# Patient Record
Sex: FEMALE | Race: BLACK OR AFRICAN AMERICAN | NOT HISPANIC OR LATINO | Employment: FULL TIME | URBAN - METROPOLITAN AREA
[De-identification: names, ages, dates, MRNs, and addresses within clinical notes are randomized per-mention and may not be internally consistent; named-entity substitution may affect disease eponyms.]

---

## 2017-03-27 ENCOUNTER — TELEPHONE (OUTPATIENT)
Dept: OBSTETRICS AND GYNECOLOGY | Age: 37
End: 2017-03-27

## 2017-05-12 ENCOUNTER — PROCEDURE VISIT (OUTPATIENT)
Dept: OBSTETRICS AND GYNECOLOGY | Age: 37
End: 2017-05-12

## 2017-05-12 ENCOUNTER — OFFICE VISIT (OUTPATIENT)
Dept: OBSTETRICS AND GYNECOLOGY | Age: 37
End: 2017-05-12

## 2017-05-12 VITALS
BODY MASS INDEX: 40.5 KG/M2 | HEIGHT: 66 IN | DIASTOLIC BLOOD PRESSURE: 74 MMHG | SYSTOLIC BLOOD PRESSURE: 130 MMHG | WEIGHT: 252 LBS

## 2017-05-12 DIAGNOSIS — D21.9 LEIOMYOMA: Primary | ICD-10-CM

## 2017-05-12 DIAGNOSIS — N92.0 MENORRHAGIA WITH REGULAR CYCLE: Primary | ICD-10-CM

## 2017-05-12 DIAGNOSIS — D21.9 LEIOMYOMA: ICD-10-CM

## 2017-05-12 PROCEDURE — 99212 OFFICE O/P EST SF 10 MIN: CPT | Performed by: OBSTETRICS & GYNECOLOGY

## 2017-05-12 PROCEDURE — 76830 TRANSVAGINAL US NON-OB: CPT | Performed by: OBSTETRICS & GYNECOLOGY

## 2017-09-15 ENCOUNTER — OFFICE VISIT (OUTPATIENT)
Dept: OBSTETRICS AND GYNECOLOGY | Age: 37
End: 2017-09-15

## 2017-09-15 VITALS
DIASTOLIC BLOOD PRESSURE: 68 MMHG | HEIGHT: 66 IN | WEIGHT: 257 LBS | SYSTOLIC BLOOD PRESSURE: 130 MMHG | BODY MASS INDEX: 41.3 KG/M2

## 2017-09-15 DIAGNOSIS — Z11.51 SCREENING FOR HPV (HUMAN PAPILLOMAVIRUS): ICD-10-CM

## 2017-09-15 DIAGNOSIS — D21.9 LEIOMYOMA: ICD-10-CM

## 2017-09-15 DIAGNOSIS — Z01.419 VISIT FOR GYNECOLOGIC EXAMINATION: Primary | ICD-10-CM

## 2017-09-15 PROCEDURE — 99395 PREV VISIT EST AGE 18-39: CPT | Performed by: OBSTETRICS & GYNECOLOGY

## 2017-09-15 NOTE — PROGRESS NOTES
"Subjective     Chief Complaint   Patient presents with   • Annual Exam     no problems       History of Present Illness    Hector Gilbert is a 37 y.o.  who presents for annual exam.  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         Current contraception: abstinence  History of abnormal Pap smear: no  Received Gardasil immunization: no  Perform regular self breast exam: yes - monthly  Family history of uterine or ovarian cancer: no  Family History of colon cancer: yes - cousin  Family history of breast cancer: yes - pat aunts-believes dx in 50's    Mammogram: not indicated.  Colonoscopy: not indicated.  DEXA: not indicated.    Exercise: moderately active  Calcium/Vitamin D: adequate intake    The following portions of the patient's history were reviewed and updated as appropriate: allergies, current medications, past family history, past medical history, past social history, past surgical history and problem list.    Review of Systems    Review of Systems   Constitutional: Negative for fatigue.   Respiratory: Negative for shortness of breath.    Gastrointestinal: Negative for abdominal pain.negative for change in bowel habits or rectal bleeding   Genitourinary: Negative for dysuria. negative for abnormal bleeding  Neurological: Negative for headaches.   Psychiatric/Behavioral: Negative for dysphoric mood.         Objective   Physical Exam    /68  Ht 66\" (167.6 cm)  Wt 257 lb (117 kg)  LMP 2017  BMI 41.48 kg/m2    General:   alert, appears stated age, cooperative and no distress   Neck: no asymmetry, masses, or scars and thyroid normal to palpation "   Heart: regular rate and rhythm, S1, S2 normal, no murmur, click, rub or gallop   Lungs: clear to auscultation bilaterally   Abdomen: soft, non-tender, without masses or organomegaly   Breast: inspection negative, no nipple discharge or bleeding, no masses or nodularity palpable and no axillary adenopathy   Vulva: normal, Bartholin's, Urethra, [place]'s normal   Vagina: normal mucosa, normal discharge   Cervix: no lesions   Uterus: normal size, mobile, non-tender, small fibroid on anterior surface stable   Adnexa: normal adnexa and no mass, fullness, tenderness   Rectal: not indicated     Assessment/Plan   Hector was seen today for annual exam.    Diagnoses and all orders for this visit:    Visit for gynecologic examination  -     IGP, Apt HPV,rfx 16 / 18,45    Screening for HPV (human papillomavirus)  -     IGP, Apt HPV,rfx 16 / 18,45      All questions answered.  Breast self exam technique reviewed and patient encouraged to perform self-exam monthly.  Discussed healthy lifestyle modifications.  Recommended 30 minutes of aerobic exercise five times per week.    Pap done per pt request    Small stable uterine fibroid palpated, imaged twice last year

## 2017-09-20 LAB
CYTOLOGIST CVX/VAG CYTO: NORMAL
CYTOLOGY CVX/VAG DOC THIN PREP: NORMAL
DX ICD CODE: NORMAL
HIV 1 & 2 AB SER-IMP: NORMAL
HPV I/H RISK 4 DNA CVX QL PROBE+SIG AMP: NEGATIVE
Lab: NORMAL
OTHER STN SPEC: NORMAL
PATH REPORT.FINAL DX SPEC: NORMAL
STAT OF ADQ CVX/VAG CYTO-IMP: NORMAL

## 2017-09-21 ENCOUNTER — TELEPHONE (OUTPATIENT)
Dept: OBSTETRICS AND GYNECOLOGY | Age: 37
End: 2017-09-21

## 2017-09-21 NOTE — TELEPHONE ENCOUNTER
----- Message from Candice Walsh MD sent at 9/20/2017  2:13 PM EDT -----  Negative pap and hpv, please call pt with results

## 2018-11-06 ENCOUNTER — OFFICE VISIT (OUTPATIENT)
Dept: OBSTETRICS AND GYNECOLOGY | Age: 38
End: 2018-11-06

## 2018-11-06 VITALS
BODY MASS INDEX: 40.18 KG/M2 | HEIGHT: 66 IN | SYSTOLIC BLOOD PRESSURE: 120 MMHG | WEIGHT: 250 LBS | DIASTOLIC BLOOD PRESSURE: 70 MMHG

## 2018-11-06 DIAGNOSIS — Z11.51 ENCOUNTER FOR SCREENING FOR HUMAN PAPILLOMAVIRUS (HPV): ICD-10-CM

## 2018-11-06 DIAGNOSIS — Z01.419 ENCOUNTER FOR GYNECOLOGICAL EXAMINATION: Primary | ICD-10-CM

## 2018-11-06 DIAGNOSIS — D21.9 LEIOMYOMA: ICD-10-CM

## 2018-11-06 PROCEDURE — 99395 PREV VISIT EST AGE 18-39: CPT | Performed by: OBSTETRICS & GYNECOLOGY

## 2018-11-06 RX ORDER — HYDROCODONE BITARTRATE AND ACETAMINOPHEN 7.5; 325 MG/1; MG/1
1 TABLET ORAL EVERY 6 HOURS PRN
COMMUNITY
End: 2020-01-22

## 2018-11-06 RX ORDER — DIAZEPAM 5 MG/1
5 TABLET ORAL 2 TIMES DAILY PRN
COMMUNITY
End: 2020-01-22

## 2018-11-06 NOTE — PROGRESS NOTES
"Subjective     Chief Complaint   Patient presents with   • Gynecologic Exam     Annual exam  Last pap 9/15/17-, HPV-       History of Present Illness    Hector Gilbert is a 38 y.o.  who presents for annual exam.  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 She has discomfort  Menses are about Q month, usually 28 to 45 days. No excessive or irregular flow.   She stopped taking metformin about 3 months ago  She has not been sexually active this year   Obstetric History:  OB History      Para Term  AB Living    0 0 0 0 0 0    SAB TAB Ectopic Molar Multiple Live Births    0 0 0   0           Menstrual History:     Patient's last menstrual period was 10/21/2018 (exact date).         Current contraception: abstinence  History of abnormal Pap smear: no  Received Gardasil immunization: no  Perform regular self breast exam: yes - monthly  Family history of uterine or ovarian cancer: no  Family History of colon cancer: yes - cousin  Family history of breast cancer: yes - 2 great aunts    Mammogram: not indicated.  Colonoscopy: not indicated.  DEXA: not indicated.    Exercise: moderately active  Calcium/Vitamin D: adequate intake    The following portions of the patient's history were reviewed and updated as appropriate: allergies, current medications, past family history, past medical history, past social history, past surgical history and problem list.    Review of Systems    Review of Systems   Constitutional: Negative for fatigue.   Respiratory: Negative for shortness of breath.    Gastrointestinal: Negative for abdominal pain.  neg for change in bowel habits or blood in stool  Genitourinary: Negative for dysuria. neg for abnormal bleeding  Neurological: Negative for headaches.   Psychiatric/Behavioral: Negative for dysphoric mood.         Objective   Physical Exam    /70   Ht 167.6 cm (66\")   Wt 113 kg (250 lb)   LMP 10/21/2018 (Exact Date)   BMI 40.35 kg/m² "     General:   alert, appears stated age, cooperative and no distress   Neck: no asymmetry, masses, or scars and thyroid normal to palpation   Heart: regular rate and rhythm, S1, S2 normal, no murmur, click, rub or gallop   Lungs: clear to auscultation bilaterally   Abdomen: soft, non-tender, without masses or organomegaly   Breast: inspection negative, no nipple discharge or bleeding, no masses or nodularity palpable and no axillary adenopathy   Vulva: normal, Bartholin's, Urethra, [place]'s normal   Vagina: normal mucosa, normal discharge   Cervix: no lesions   Uterus: normal size, mobile, non-tender   Adnexa: normal adnexa and no mass, fullness, tenderness   Rectal: not indicated     Assessment/Plan   Hector was seen today for gynecologic exam.    Diagnoses and all orders for this visit:    Encounter for gynecological examination  -     IGP, Apt HPV,rfx 16 / 18,45    Encounter for screening for human papillomavirus (HPV)  -     IGP, Apt HPV,rfx 16 / 18,45    Leiomyoma        All questions answered.  Breast self exam technique reviewed and patient encouraged to perform self-exam monthly.  Discussed healthy lifestyle modifications.  Recommended 30 minutes of aerobic exercise five times per week.  Pap done per pt preference  Advised pt to call with any abnormal bleeding or prolonged missed menses, particularly as she has stopped metformin    Pt had a small  fibroid noted last year and imaged twice. She denies any heavy flow or pelvic pain. Advised to call with issues

## 2018-11-08 LAB
CYTOLOGIST CVX/VAG CYTO: NORMAL
CYTOLOGY CVX/VAG DOC THIN PREP: NORMAL
DX ICD CODE: NORMAL
HIV 1 & 2 AB SER-IMP: NORMAL
HPV I/H RISK 4 DNA CVX QL PROBE+SIG AMP: NEGATIVE
OTHER STN SPEC: NORMAL
PATH REPORT.FINAL DX SPEC: NORMAL
STAT OF ADQ CVX/VAG CYTO-IMP: NORMAL

## 2018-11-12 ENCOUNTER — TELEPHONE (OUTPATIENT)
Dept: OBSTETRICS AND GYNECOLOGY | Age: 38
End: 2018-11-12

## 2018-11-12 NOTE — TELEPHONE ENCOUNTER
----- Message from Candice Walsh MD sent at 11/8/2018  6:40 PM EST -----  Please call Fan[place], her pap and hpv are negative/normal

## 2019-11-20 ENCOUNTER — OFFICE VISIT (OUTPATIENT)
Dept: OBSTETRICS AND GYNECOLOGY | Age: 39
End: 2019-11-20

## 2019-11-20 VITALS
BODY MASS INDEX: 42.49 KG/M2 | DIASTOLIC BLOOD PRESSURE: 70 MMHG | WEIGHT: 264.4 LBS | HEIGHT: 66 IN | SYSTOLIC BLOOD PRESSURE: 110 MMHG

## 2019-11-20 DIAGNOSIS — Z11.51 ENCOUNTER FOR SCREENING FOR HUMAN PAPILLOMAVIRUS (HPV): ICD-10-CM

## 2019-11-20 DIAGNOSIS — Z01.419 ENCOUNTER FOR GYNECOLOGICAL EXAMINATION: Primary | ICD-10-CM

## 2019-11-20 DIAGNOSIS — N92.0 MENORRHAGIA WITH REGULAR CYCLE: ICD-10-CM

## 2019-11-20 DIAGNOSIS — Z13.0 SCREENING FOR IRON DEFICIENCY ANEMIA: ICD-10-CM

## 2019-11-20 DIAGNOSIS — R07.9 LEFT-SIDED CHEST PAIN: ICD-10-CM

## 2019-11-20 DIAGNOSIS — Z13.29 SCREENING FOR THYROID DISORDER: ICD-10-CM

## 2019-11-20 PROBLEM — M47.16 LUMBAR SPONDYLOSIS WITH MYELOPATHY: Status: ACTIVE | Noted: 2018-07-12

## 2019-11-20 PROBLEM — Z98.890 S/P LUMBAR LAMINECTOMY: Status: ACTIVE | Noted: 2018-08-07

## 2019-11-20 PROBLEM — D36.10 NEUROFIBROMA: Status: ACTIVE | Noted: 2018-07-12

## 2019-11-20 PROBLEM — D49.7 INTRADURAL TUMOR: Status: ACTIVE | Noted: 2018-07-20

## 2019-11-20 PROBLEM — D49.2 LUMBAR SPINE TUMOR: Status: ACTIVE | Noted: 2018-08-07

## 2019-11-20 LAB
BASOPHILS # BLD AUTO: 0.06 10*3/MM3 (ref 0–0.2)
BASOPHILS NFR BLD AUTO: 1.3 % (ref 0–1.5)
EOSINOPHIL # BLD AUTO: 0.11 10*3/MM3 (ref 0–0.4)
EOSINOPHIL NFR BLD AUTO: 2.4 % (ref 0.3–6.2)
ERYTHROCYTE [DISTWIDTH] IN BLOOD BY AUTOMATED COUNT: 13.9 % (ref 12.3–15.4)
HCT VFR BLD AUTO: 37 % (ref 34–46.6)
HGB BLD-MCNC: 12.1 G/DL (ref 12–15.9)
IMM GRANULOCYTES # BLD AUTO: 0.01 10*3/MM3 (ref 0–0.05)
IMM GRANULOCYTES NFR BLD AUTO: 0.2 % (ref 0–0.5)
LYMPHOCYTES # BLD AUTO: 1.14 10*3/MM3 (ref 0.7–3.1)
LYMPHOCYTES NFR BLD AUTO: 25.2 % (ref 19.6–45.3)
MCH RBC QN AUTO: 26.9 PG (ref 26.6–33)
MCHC RBC AUTO-ENTMCNC: 32.7 G/DL (ref 31.5–35.7)
MCV RBC AUTO: 82.4 FL (ref 79–97)
MONOCYTES # BLD AUTO: 0.39 10*3/MM3 (ref 0.1–0.9)
MONOCYTES NFR BLD AUTO: 8.6 % (ref 5–12)
NEUTROPHILS # BLD AUTO: 2.82 10*3/MM3 (ref 1.7–7)
NEUTROPHILS NFR BLD AUTO: 62.3 % (ref 42.7–76)
NRBC BLD AUTO-RTO: 0 /100 WBC (ref 0–0.2)
PLATELET # BLD AUTO: 374 10*3/MM3 (ref 140–450)
RBC # BLD AUTO: 4.49 10*6/MM3 (ref 3.77–5.28)
TSH SERPL DL<=0.005 MIU/L-ACNC: 0.91 UIU/ML (ref 0.27–4.2)
WBC # BLD AUTO: 4.53 10*3/MM3 (ref 3.4–10.8)

## 2019-11-20 PROCEDURE — 99395 PREV VISIT EST AGE 18-39: CPT | Performed by: OBSTETRICS & GYNECOLOGY

## 2019-11-20 RX ORDER — PHENTERMINE HYDROCHLORIDE 30 MG/1
30 CAPSULE ORAL EVERY MORNING
COMMUNITY
End: 2020-01-22

## 2019-11-20 NOTE — PROGRESS NOTES
"Subjective      Chief complaint: annual exam    History of Present Illness    Hector Gilbert is a 39 y.o. female who presents for annual exam.  Menses are regular Q month; she has noted heavier bleeding and more frequent flow at times  She reports she went to ER a few nights ago due to chest pain but they did not find any abnormalities. The ER MD felt it could be referred pain from her back. She notes she may have another tumor in her back. She previously had a benign tumor resected.    Obstetric History:  OB History      Para Term  AB Living    0 0 0 0 0 0    SAB TAB Ectopic Molar Multiple Live Births    0 0 0   0           Menstrual History:     Patient's last menstrual period was 2019 (exact date).         Current contraception: abstinence  History of abnormal Pap smear: no  Received Gardasil immunization: no  Family history of uterine or ovarian cancer: no  Family History of colon cancer/colon polyps: cousin  Regular self breast exam: yes  Family history of breast cancer: yes - pat great aunts      The following portions of the patient's history were reviewed and updated as appropriate: allergies, current medications, past family history, past medical history, past social history, past surgical history and problem list.    Review of Systems    Constitutional: negative for fatigue and fevers  Respiratory: negative  Cardiovascular: positive for chest pain  Gastrointestinal: negative for abdominal pain and change in bowel habits  Genitourinary:positive for heavier flow  Integument/breast: negative for breast lump and rash  Neurological: negative for dizziness and headaches  Behavioral/Psych: negative for anxiety and depression     Objective   Physical Exam    /70   Ht 167.6 cm (66\")   Wt 120 kg (264 lb 6.4 oz)   LMP 2019 (Exact Date)   Breastfeeding? No   BMI 42.68 kg/m²     General:   alert, appears stated age, cooperative and no distress   Heart: regular rate and rhythm, " S1, S2 normal, no murmur, click, rub or gallop   Lungs: clear to auscultation bilaterally   Abdomen: soft, non-tender, without masses or organomegaly   Breast: inspection negative, no nipple discharge or bleeding, no masses or nodularity palpable and no axillary adenopathy   Vulva: normal, Bartholin's, Urethra, [place]'s normal   Vagina: normal mucosa, normal discharge   Cervix: no lesions   Uterus: normal size, mobile, non-tender, normal shape and consistency   Adnexa: normal adnexa and no mass, fullness, tenderness     Assessment/Plan   Hector was seen today for gynecologic exam.    Diagnoses and all orders for this visit:    Encounter for gynecological examination  -     IGP, Apt HPV,rfx 16 / 18,45    Encounter for screening for human papillomavirus (HPV)  -     IGP, Apt HPV,rfx 16 / 18,45    Screening for thyroid disorder  -     TSH    Screening for iron deficiency anemia  -     CBC & Differential    Menorrhagia with regular cycle  -     TSH  -     CBC & Differential    Left-sided chest pain  -     Mammo diagnostic digital tomosynthesis bilateral w CAD; Future  -     US breast left complete; Future        Recommended ongoing SBE and exercise; pt notes she walks daily  Pap done per pt request    Chest wall pain: pt had neg evaluation at ER. She will see primary, plan diagnostic breast imaging to assess for any etiology there.     Menorrhagia: recent onset. Plan labs and u/s. Pt had hx PCOS but stopped metformin about 2 years ago because her menses had normalized on there own

## 2019-11-24 LAB
CYTOLOGIST CVX/VAG CYTO: NORMAL
CYTOLOGY CVX/VAG DOC CYTO: NORMAL
CYTOLOGY CVX/VAG DOC THIN PREP: NORMAL
DX ICD CODE: NORMAL
HIV 1 & 2 AB SER-IMP: NORMAL
HPV I/H RISK 4 DNA CVX QL PROBE+SIG AMP: NEGATIVE
OTHER STN SPEC: NORMAL
STAT OF ADQ CVX/VAG CYTO-IMP: NORMAL

## 2019-11-25 ENCOUNTER — TELEPHONE (OUTPATIENT)
Dept: OBSTETRICS AND GYNECOLOGY | Age: 39
End: 2019-11-25

## 2019-11-25 NOTE — TELEPHONE ENCOUNTER
----- Message from Candice Walsh MD sent at 11/22/2019  7:44 AM EST -----  Call Angie, her hgb and TSH are normal, keep f/u appt for further evaluation

## 2019-11-25 NOTE — TELEPHONE ENCOUNTER
----- Message from Candice Walsh MD sent at 11/24/2019  7:37 PM EST -----  Call pt, her pap and hpv are negative/normal

## 2019-12-10 ENCOUNTER — APPOINTMENT (OUTPATIENT)
Dept: WOMENS IMAGING | Facility: HOSPITAL | Age: 39
End: 2019-12-10

## 2019-12-10 PROCEDURE — 76641 ULTRASOUND BREAST COMPLETE: CPT | Performed by: RADIOLOGY

## 2019-12-10 PROCEDURE — 77066 DX MAMMO INCL CAD BI: CPT | Performed by: RADIOLOGY

## 2019-12-10 PROCEDURE — 77062 BREAST TOMOSYNTHESIS BI: CPT | Performed by: RADIOLOGY

## 2019-12-10 PROCEDURE — G0279 TOMOSYNTHESIS, MAMMO: HCPCS | Performed by: RADIOLOGY

## 2019-12-11 ENCOUNTER — TELEPHONE (OUTPATIENT)
Dept: OBSTETRICS AND GYNECOLOGY | Age: 39
End: 2019-12-11

## 2019-12-11 NOTE — TELEPHONE ENCOUNTER
Hi. I see the report in her chart. I'm happy to contact the pt tomorrow/thursday to let her know and to place the order but wanted to forward this to you to review since you are on call. Just let me know if you need me to follow through on contact and orders.

## 2019-12-11 NOTE — TELEPHONE ENCOUNTER
(Nico pt) Lisa from [facility] is calling for an order on a stereotactic biopsy for architectural distortions in pt's left breast.

## 2019-12-12 ENCOUNTER — TELEPHONE (OUTPATIENT)
Dept: OBSTETRICS AND GYNECOLOGY | Age: 39
End: 2019-12-12

## 2019-12-12 DIAGNOSIS — R92.8 ABNORMAL MAMMOGRAM: Primary | ICD-10-CM

## 2019-12-12 NOTE — TELEPHONE ENCOUNTER
Called pt to review mammogram report and advised that biopsy and additional imaging is needed. Orders placed for pt.

## 2019-12-12 NOTE — TELEPHONE ENCOUNTER
Notified pt of appt - first available at [facility] is 1/9/20 at 8am.  Pt seemed ok with this date, but I told her I would let Dr. Walsh know, in case she feels there is any urgency or need to move sooner we could try to schedule at [facility].  Pt will discuss more tomorrow at gyn f/u.

## 2019-12-13 ENCOUNTER — OFFICE VISIT (OUTPATIENT)
Dept: OBSTETRICS AND GYNECOLOGY | Age: 39
End: 2019-12-13

## 2019-12-13 ENCOUNTER — PROCEDURE VISIT (OUTPATIENT)
Dept: OBSTETRICS AND GYNECOLOGY | Age: 39
End: 2019-12-13

## 2019-12-13 VITALS
HEIGHT: 66 IN | WEIGHT: 263.6 LBS | SYSTOLIC BLOOD PRESSURE: 140 MMHG | BODY MASS INDEX: 42.36 KG/M2 | DIASTOLIC BLOOD PRESSURE: 72 MMHG

## 2019-12-13 DIAGNOSIS — N92.0 MENORRHAGIA WITH REGULAR CYCLE: ICD-10-CM

## 2019-12-13 DIAGNOSIS — R92.8 ABNORMAL MAMMOGRAM: Primary | ICD-10-CM

## 2019-12-13 DIAGNOSIS — N92.1 MENORRHAGIA WITH IRREGULAR CYCLE: ICD-10-CM

## 2019-12-13 DIAGNOSIS — D21.9 LEIOMYOMA: Primary | ICD-10-CM

## 2019-12-13 DIAGNOSIS — Z01.812 PRE-PROCEDURE LAB EXAM: ICD-10-CM

## 2019-12-13 LAB
B-HCG UR QL: NEGATIVE
INTERNAL NEGATIVE CONTROL: NEGATIVE
INTERNAL POSITIVE CONTROL: POSITIVE
Lab: NORMAL

## 2019-12-13 PROCEDURE — 81025 URINE PREGNANCY TEST: CPT | Performed by: OBSTETRICS & GYNECOLOGY

## 2019-12-13 PROCEDURE — 76830 TRANSVAGINAL US NON-OB: CPT | Performed by: OBSTETRICS & GYNECOLOGY

## 2019-12-13 PROCEDURE — 58100 BIOPSY OF UTERUS LINING: CPT | Performed by: OBSTETRICS & GYNECOLOGY

## 2019-12-13 PROCEDURE — 99213 OFFICE O/P EST LOW 20 MIN: CPT | Performed by: OBSTETRICS & GYNECOLOGY

## 2019-12-13 NOTE — PROGRESS NOTES
"Chief complaint: menorrhagia; abnormal mammogram    HPI  Hector Gilbert is a 39 y.o. female presents for f/u u/s due to menorrhagia. She notes that last menses was on time but heavy initially. She denies any chest pain since her last appt. She denies any breast pain or masses.          The following portions of the patient's history were reviewed and updated as appropriate: allergies, current medications, past family history, past medical history, past social history, past surgical history and problem list.    Review of Systems  Constitutional: negative  Cardiovascular: neg for chest pain, resolved following last visit  Genitourinary:pos for menorrhagia  Integument/breast: negative for breast lump and breast tenderness    /72   Ht 167.6 cm (66\")   Wt 120 kg (263 lb 9.6 oz)   LMP 12/08/2019 (Exact Date)   Breastfeeding No   BMI 42.55 kg/m²         Physical Exam   Constitutional: She is oriented to person, place, and time. She appears well-developed and well-nourished.   Pulmonary/Chest: Effort normal.   Genitourinary:   Genitourinary Comments: Vagina and cervix without lesions   Neurological: She is alert and oriented to person, place, and time.   Skin: Skin is warm and dry.   Psychiatric: She has a normal mood and affect. Her behavior is normal.           Hector was seen today for follow-up.    Diagnoses and all orders for this visit:    Abnormal mammogram    Menorrhagia with irregular cycle  -     Reference Histopathology    Pre-procedure lab exam  -     POC Pregnancy, Urine    pt aware she needs biopsy for mammographic abnormality, plan f/u here following to review    Recommend embx due to change in bleeding and hx of PCOS; risks and benefits reviewed and pt agrees. Fibroid noted with slight growth. Doubt this is causing flow increase as it is subserosal.      .Endometrial Biopsy Procedure Note    Pre-operative Diagnosis: menorrhagia    Post-operative Diagnosis: same    Indications: " menorrhagia    Procedure Details    Urine pregnancy test was done today and result was negative.  The risks (including infection, bleeding, pain, and uterine perforation) and benefits of the procedure were explained to the patient and Verbal and written informed consent was obtained.        The patient was placed in the dorsal lithotomy position.  A speculum inserted in the vagina, and the cervix prepped with povidone iodine X 3.      A sharp tenaculum was applied to the anterior lip of the cervix for stabilization.  A Pipelle was used to sound the uterus to a depth of 8cm and sample the endometrium using sterile technique.  Sample was sent for pathologic examination.    Condition:  Stable    Complications:  None  Patient tolerated the procedure well without complications.    Plan:    The patient was advised to call for any fever or for prolonged or severe pain or bleeding. Will call pt with results.

## 2019-12-13 NOTE — TELEPHONE ENCOUNTER
Appt moved sooner to 12/26 10am at [facility] (per manager this is a work-in, pt may have a short wait)  Pt is aware of this.  I will check next week to see if there are any cancellations.      Scheduling is complicated due to needing additional imaging prior to biopsy.  [facility] might have been able to schedule 12/20 BUT would have first needed imaging for radiologist to review.  And per manager, if they billed a second diagnostic mammo then insurance would deny it and the pt would get a full bill.  If she returned to [facility] they can addend the original report and pt would only have one charge/bill for the diagnostic imaging.

## 2019-12-18 LAB
PATH REPORT.FINAL DX SPEC: NORMAL
PATH REPORT.GROSS SPEC: NORMAL
PATH REPORT.RELEVANT HX SPEC: NORMAL
PATH REPORT.SITE OF ORIGIN SPEC: NORMAL
PATHOLOGIST NAME: NORMAL
PAYMENT PROCEDURE: NORMAL

## 2019-12-26 ENCOUNTER — APPOINTMENT (OUTPATIENT)
Dept: WOMENS IMAGING | Facility: HOSPITAL | Age: 39
End: 2019-12-26

## 2019-12-26 PROCEDURE — 19081 BX BREAST 1ST LESION STRTCTC: CPT | Performed by: RADIOLOGY

## 2019-12-30 ENCOUNTER — TELEPHONE (OUTPATIENT)
Dept: SURGERY | Facility: CLINIC | Age: 39
End: 2019-12-30

## 2019-12-30 ENCOUNTER — TELEPHONE (OUTPATIENT)
Dept: OBSTETRICS AND GYNECOLOGY | Age: 39
End: 2019-12-30

## 2019-12-30 DIAGNOSIS — R92.8 ABNORMAL MAMMOGRAM: Primary | ICD-10-CM

## 2019-12-30 NOTE — TELEPHONE ENCOUNTER
New patient appointment with Dr. Kennedy is scheduled on 1/22/2020 @ 11:00am.    Called and spoke to patient expressed verbal understanding of appointment times.    Sent patient a reminder letter in the mail.

## 2019-12-30 NOTE — TELEPHONE ENCOUNTER
Called pt and discussed pathology results. Referred to breast surgeon for further recommendations regarding radial sclerosing lesion. Pt understands and agrees. Advised her to call if she does not receive contact regarding appt.

## 2020-01-02 ENCOUNTER — TELEPHONE (OUTPATIENT)
Dept: OBSTETRICS AND GYNECOLOGY | Age: 40
End: 2020-01-02

## 2020-01-02 NOTE — TELEPHONE ENCOUNTER
Dr. Walsh Pt called wanting to know if she still needs to come for  Her f/u appt 1/3/20 since she already discussed results w/Dr. Eng and was referred out..  Pls advise.    CB # [id number]

## 2020-01-22 ENCOUNTER — PREP FOR SURGERY (OUTPATIENT)
Dept: OTHER | Facility: HOSPITAL | Age: 40
End: 2020-01-22

## 2020-01-22 ENCOUNTER — OFFICE VISIT (OUTPATIENT)
Dept: SURGERY | Facility: CLINIC | Age: 40
End: 2020-01-22

## 2020-01-22 VITALS
OXYGEN SATURATION: 99 % | WEIGHT: 264 LBS | HEIGHT: 66 IN | BODY MASS INDEX: 42.43 KG/M2 | DIASTOLIC BLOOD PRESSURE: 85 MMHG | SYSTOLIC BLOOD PRESSURE: 127 MMHG | HEART RATE: 92 BPM

## 2020-01-22 DIAGNOSIS — N64.89 RADIAL SCAR OF BREAST: Primary | ICD-10-CM

## 2020-01-22 PROCEDURE — 99244 OFF/OP CNSLTJ NEW/EST MOD 40: CPT | Performed by: SURGERY

## 2020-01-22 RX ORDER — IBUPROFEN 600 MG/1
TABLET ORAL
COMMUNITY
Start: 2019-12-23

## 2020-01-22 RX ORDER — CEFAZOLIN SODIUM IN 0.9 % NACL 3 G/100 ML
3 INTRAVENOUS SOLUTION, PIGGYBACK (ML) INTRAVENOUS ONCE
Status: CANCELLED | OUTPATIENT
Start: 2020-02-24 | End: 2020-01-22

## 2020-01-22 RX ORDER — DIAZEPAM 5 MG/1
10 TABLET ORAL ONCE
Status: CANCELLED | OUTPATIENT
Start: 2020-02-24 | End: 2020-01-22

## 2020-01-22 NOTE — PROGRESS NOTES
[facility]     Referring Provider: Candice Walsh MD     Chief complaint: Left breast radial sclerosing lesion     HPI: Ms. Hector Gilbert is a 40 yo woman, seen at the request of Dr. Candice Walsh, for a new diagnosis of left breast radial sclerosing lesion. In 2019, the patient presented to the ER complaining of left chest pain. The patient reports that the work-up was negative and they said it could possibly be referred pain from her back. She was subsequently evaluated by Dr. Zimmerman and sent for diagnostic breast imaging. Mammogram revealed an incidental area of architectural distortion and subsequent biopsy showed a radial sclerosing lesion (see imaging and pathology report details in breast history section below). The patient says that the pain was really in her lower left chest wall (the outer rib area below her breast). She describes the pain as an aching sensation. The pain has since resolved. She denies any associated breast lumps, skin changes, or nipple discharge. She denies any prior history of abnormal mammograms or breast biopsies. She denies any family history of breast or ovarian cancer. She was joined today in clinic by her mother, Ava, and aunt, Yvette. They all participated in the discussion, after her examination, and she gave her consent for them to participate.       I personally reviewed her records and summarized her relevant breast history/imagin/10/19, Bilateral Diagnostic MMG with Lul & Left Breast US ([facility]):  MMG:  Heterogeneously dense. No area of focal pain was marked on the left breast. There is an area of architectural distortion measuring estimated 4 cm with associated very faint associated calcifications seen in the anterior one-third region of the left breast at 12:00. No other suspicious findings is seen. In the right breast, no suspicious masses, significant calcifications or other abnormalities are seen.   US:   No sonographic abnormality was seen by  the technologist. And island of echogenic tissue was noted at 12:00, but no mass or distortion.   BI-RADS 4C: Suspicious.    12/26/19, Left Breast, Stereotactic Biopsy ([facility]):  Left Breast Distal 12:00, Stereotactic Biopsy:   Radial Sclerosing Lesion with Duct Ectasia, Duct Hyperplasia of the Usual Type, Dense Hyaline Stromal Change and Apocrine Metaplasia with Columnar Cell Change.   Microcalcifications are Present.  -Tophat clip. Concordant. Please note that mammographic distortion spans approximately 18 mm.      Review of Systems   Constitutional: Negative for appetite change, chills, diaphoresis, fatigue, fever and unexpected weight change.   HENT:   Negative for hearing loss, lump/mass, mouth sores, nosebleeds, sore throat, tinnitus, trouble swallowing and voice change.    Eyes: Negative for eye problems and icterus.   Respiratory: Negative for chest tightness, cough, hemoptysis, shortness of breath and wheezing.    Cardiovascular: Negative for chest pain, leg swelling and palpitations.   Gastrointestinal: Positive for constipation. Negative for abdominal distention, abdominal pain, blood in stool, diarrhea, nausea, rectal pain and vomiting.        Chronic   Endocrine: Negative for hot flashes.   Genitourinary: Negative for bladder incontinence, difficulty urinating, dyspareunia, dysuria, frequency, hematuria, menstrual problem, nocturia, pelvic pain, vaginal bleeding and vaginal discharge.    Musculoskeletal: Negative for arthralgias, back pain, flank pain, gait problem, myalgias, neck pain and neck stiffness.   Skin: Negative for itching, rash and wound.   Neurological: Negative for dizziness, extremity weakness, gait problem, headaches, light-headedness, numbness, seizures and speech difficulty.   Hematological: Negative for adenopathy. Does not bruise/bleed easily.   Psychiatric/Behavioral: Negative for confusion, decreased concentration, depression, sleep disturbance and suicidal ideas. The patient is not  nervous/anxious.    I personally reviewed and updated the ROS.      Medications:    Current Outpatient Medications:   •  esomeprazole (NexIUM) 40 MG capsule, TK ONE C PO QD, Disp: , Rfl: 3  •  ibuprofen (ADVIL,MOTRIN) 600 MG tablet, TK 1 T PO  Q 8 H PRN P, Disp: , Rfl:   •  levocetirizine (XYZAL) 5 MG tablet, TK 1 T PO D IN THE CARRIE, Disp: , Rfl: 2      Allergies:  No Known Allergies      Past Medical History:   Diagnosis Date   • Arthritis    • Polycystic ovary syndrome        Past Surgical History:   Procedure Laterality Date   • SPINAL FIXATION SURGERY     • SPINE SURGERY         Family History   Problem Relation Age of Onset   • Hypertension Mother    • Multiple myeloma Maternal Aunt    • Colon cancer Paternal Cousin 42   • Coronary artery disease Maternal Grandmother    • Diabetes Maternal Grandmother    • Ovarian cancer Neg Hx    • Uterine cancer Neg Hx    • Melanoma Neg Hx    • Prostate cancer Neg Hx        Social History     Socioeconomic History   • Marital status: Single     Spouse name: Not on file   • Number of children: 0   • Years of education: Not on file   • Highest education level: Not on file   Occupational History   • Occupation:      Employer: [employer]   Tobacco Use   • Smoking status: Never Smoker   • Smokeless tobacco: Never Used   Substance and Sexual Activity   • Alcohol use: Never     Frequency: Never   • Drug use: No   • Sexual activity: Not Currently     Partners: Male     Birth control/protection: Abstinence     Patient drinks 2-3 servings of caffeine per day.       GYNECOLOGIC HISTORY:   . P: 0. AB: 0.  Last menstrual period: 21  Age at menarche: 11  Age at first childbirth: n/a  Lactation/How long: n/a  Age at menopause: premenopausal  Total years of oral contraceptive use: 1-2  Total years of hormone replacement therapy: none      Physical Exam  Vitals:    20 1102   BP: 127/85   Pulse: 92   SpO2: 99%     ECOG 0 -  Asymptomatic  General: NAD, well appearing  Psych: a&o x 3, normal mood and affect  Eyes: EOMI, no scleral icterus  ENMT: neck supple without masses or thyromegaly, mucus membranes moist  Resp: normal effort, CTAB  CV: RRR, no murmurs, no edema   GI: soft, NT, ND  MSK: normal gait, normal ROM in bilateral shoulders  Lymph nodes: no cervical, supraclavicular or axillary lymphadenopathy  Breast: Very large size, grade 3 ptosis, right slightly larger than left  Right: No visible abnormalities on inspection while seated, with arms raised or hands on hips. No masses, skin changes, or nipple abnormalities.  Left: There is a superior biopsy site, otherwise no visible abnormalities on inspection while seated, with arms raised or hands on hips. No masses or nipple abnormalities.    Left breast, in-office ultrasound: At 12:30, 5 cm FN, possible biopsy site is visualized. Clip is not seen.       I have independently reviewed her imaging and here are my findings:   In the left superior/retroareolar breast there is a 1.8 cm area of architectural distortion. The biopsy clip is located near the lateral edge of this distortion.      Assessment:  39 y.o. F with a new diagnosis of a left breast radial sclerosing lesion.    Discussion:  We reviewed her pathology and imaging reports. We discussed that the finding of a radial sclerosing lesion is associated with an approximately 15-20% risk of identifying atypia, DCIS, or invasive carcinoma within the vicinity of the lesion. This cannot be recognized on the small volume of tissue obtained at percutaneous biopsy. Therefore excisional biopsy with more generous tissue sampling and pathologic analysis is recommended. She is in agreement with this plan.     We discussed that excisional biopsy would require pre-operative wire-localization. We discussed that should the final pathology be upgraded, she would likely require an additional operation. We reviewed additional risks and potential  complications associated with surgery, including, but not limited to, bleeding, infection, deformity or poor cosmetic result, chronic pain, numbness, seroma, hematoma, altered nipple sensation, nipple retraction, deep venous thrombosis, and skin flap necrosis. We reviewed postoperative wound care and activity restrictions. She expressed an understanding of these factors and wished to proceed.    Plan:  -Left breast needle-localized excisional biopsy.      Unique Kennedy MD      CC:  MD Loren Vega MD Lane Roland, MD

## 2020-01-23 ENCOUNTER — TRANSCRIBE ORDERS (OUTPATIENT)
Dept: SURGERY | Facility: CLINIC | Age: 40
End: 2020-01-23

## 2020-01-23 DIAGNOSIS — N64.89 RADIAL SCAR OF BREAST: Primary | ICD-10-CM

## 2020-01-24 ENCOUNTER — HOSPITAL ENCOUNTER (OUTPATIENT)
Facility: HOSPITAL | Age: 40
Setting detail: HOSPITAL OUTPATIENT SURGERY
End: 2020-01-24
Attending: SURGERY | Admitting: SURGERY

## 2020-02-03 ENCOUNTER — TELEPHONE (OUTPATIENT)
Dept: SURGERY | Facility: CLINIC | Age: 40
End: 2020-02-03

## 2020-02-03 NOTE — TELEPHONE ENCOUNTER
Surgery is scheduled on 2/24/2020 @ 11:30am,  NL @ 10:00am, patient arrival 8:00am.    PAT is scheduled on 2/10/2020 @ 9:00am.    Post-op appointment with Dr. Kennedy is scheduled on 3/5/2020 @ 9:00am.    Called and spoke to patient, patient expressed verbal understanding of appointment times.

## 2020-02-04 ENCOUNTER — TELEPHONE (OUTPATIENT)
Dept: SURGERY | Facility: CLINIC | Age: 40
End: 2020-02-04

## 2020-02-04 NOTE — TELEPHONE ENCOUNTER
Patient called to cancel surgery with Dr. Kennedy.    She is having her procedure done at [place].

## 2020-02-06 ENCOUNTER — TELEPHONE (OUTPATIENT)
Dept: OBSTETRICS AND GYNECOLOGY | Age: 40
End: 2020-02-06

## 2020-02-06 NOTE — TELEPHONE ENCOUNTER
(Nico pt) Pt is scheduled to come in Friday for a F/U and on the appt notes it states it is to review plans. However patient hasn't had her breast surgery yet and she doesn't know why else she would need to come in. What is the reasoning for appt and should it wait until after surgery?     [phone]

## 2020-02-06 NOTE — TELEPHONE ENCOUNTER
I'm not sure I can answer that for Dr Walsh, I just see that she recommended a visit to consult and discuss plans. I am sending to Dr Walsh also for her input

## 2020-02-07 NOTE — TELEPHONE ENCOUNTER
The f/u was to discuss all of her testing and check on bleeding issues. If she wants to r/s until after her breast surgery she can

## 2020-02-10 ENCOUNTER — APPOINTMENT (OUTPATIENT)
Dept: PREADMISSION TESTING | Facility: HOSPITAL | Age: 40
End: 2020-02-10

## 2020-02-24 ENCOUNTER — APPOINTMENT (OUTPATIENT)
Dept: MAMMOGRAPHY | Facility: HOSPITAL | Age: 40
End: 2020-02-24

## 2020-03-13 ENCOUNTER — OFFICE VISIT (OUTPATIENT)
Dept: OBSTETRICS AND GYNECOLOGY | Age: 40
End: 2020-03-13

## 2020-03-13 VITALS
WEIGHT: 263 LBS | SYSTOLIC BLOOD PRESSURE: 120 MMHG | DIASTOLIC BLOOD PRESSURE: 78 MMHG | BODY MASS INDEX: 42.27 KG/M2 | HEIGHT: 66 IN

## 2020-03-13 DIAGNOSIS — E28.2 PCOS (POLYCYSTIC OVARIAN SYNDROME): ICD-10-CM

## 2020-03-13 DIAGNOSIS — R92.8 ABNORMAL MAMMOGRAM: Primary | ICD-10-CM

## 2020-03-13 PROCEDURE — 99213 OFFICE O/P EST LOW 20 MIN: CPT | Performed by: OBSTETRICS & GYNECOLOGY

## 2020-03-13 NOTE — PROGRESS NOTES
"Chief complaint:abnormal breast imaging and heavy menstrual flow    HPI  Hector Gilbert is a 39 y.o. female presents for f/u evaluation. She notes that her chest pain has resolved since her breast excision. She decided to change to Dr. Mccarty for breast surgery. She had a benign biopsy result. He advised her to have routine mammogram in 1 year. She reports she has 3 normal menses with normal flow since her last appt.        The following portions of the patient's history were reviewed and updated as appropriate: allergies, current medications, past family history, past medical history, past social history, past surgical history and problem list.    Review of Systems  Constitutional: negative for fevers  Genitourinary:negative for irregular or heavy menstrual flow since her last appt  Integument/breast: negative for redness or drainage from breast excision site    /78   Ht 167.6 cm (66\")   Wt 119 kg (263 lb)   LMP 02/28/2020   Breastfeeding No   BMI 42.45 kg/m²         Physical Exam   Constitutional: She is oriented to person, place, and time. She appears well-developed and well-nourished.   HENT:   Head: Normocephalic and atraumatic.   Pulmonary/Chest: Effort normal.   Left breast with healing scar above left areola. No surrounding erythema or discharge.   Neurological: She is alert and oriented to person, place, and time.   Psychiatric: She has a normal mood and affect. Her behavior is normal.       embx 12/19: inactive endometrium with glandular and stromal breakdown    Breast excision 3/20: benign breast with radial scar, negative for carcinoma or atypia      Hector was seen today for follow-up.    Diagnoses and all orders for this visit:    Abnormal mammogram    PCOS (polycystic ovarian syndrome)      Pt with return to normal menses the last several months. Discussed risks of endometrial hyperplasia with PCOS. Discussed that progestin therapy can decrease this risk. Pt prefers to avoid any medical " treatment at this time. She agrees to monitor her flow and call with any heavy or irregular or lack of bleeding. Plan f/u 3 months with u/s to re-assess and pt agrees. She is not sexually active currently.     Pt had benign results from breast excision and was advised by surgeon to return to yearly mammogram for f/u.

## 2020-06-30 ENCOUNTER — TELEPHONE (OUTPATIENT)
Dept: OBSTETRICS AND GYNECOLOGY | Age: 40
End: 2020-06-30

## 2020-07-15 ENCOUNTER — TELEPHONE (OUTPATIENT)
Dept: OBSTETRICS AND GYNECOLOGY | Age: 40
End: 2020-07-15

## 2020-07-15 NOTE — TELEPHONE ENCOUNTER
(Nico cabrera) Pt has someone in her home who has been to the ER to be tested for COVID-19 due to symptoms but they do not have their results yet. Pt is not having any symptoms and she is wondering if she is cleared to come in today?    [phone]

## 2020-08-11 ENCOUNTER — TELEPHONE (OUTPATIENT)
Dept: OBSTETRICS AND GYNECOLOGY | Age: 40
End: 2020-08-11

## 2020-08-11 NOTE — TELEPHONE ENCOUNTER
(Nico Pt) Pt called stating she just started her menstrual cycle and tomorrow is supposed to be a heavier day. Pt wanting to know if it is ok to keep appointment?     [phone]

## 2020-08-12 ENCOUNTER — PROCEDURE VISIT (OUTPATIENT)
Dept: OBSTETRICS AND GYNECOLOGY | Age: 40
End: 2020-08-12

## 2020-08-12 ENCOUNTER — OFFICE VISIT (OUTPATIENT)
Dept: OBSTETRICS AND GYNECOLOGY | Age: 40
End: 2020-08-12

## 2020-08-12 VITALS
HEIGHT: 66 IN | WEIGHT: 263.2 LBS | SYSTOLIC BLOOD PRESSURE: 130 MMHG | BODY MASS INDEX: 42.3 KG/M2 | DIASTOLIC BLOOD PRESSURE: 70 MMHG

## 2020-08-12 DIAGNOSIS — E28.2 PCOS (POLYCYSTIC OVARIAN SYNDROME): Primary | ICD-10-CM

## 2020-08-12 DIAGNOSIS — E28.2 PCOS (POLYCYSTIC OVARIAN SYNDROME): ICD-10-CM

## 2020-08-12 DIAGNOSIS — Z87.42 HISTORY OF IRREGULAR MENSTRUAL BLEEDING: ICD-10-CM

## 2020-08-12 DIAGNOSIS — Z12.39 SCREENING FOR BREAST CANCER: ICD-10-CM

## 2020-08-12 DIAGNOSIS — N64.89 RADIAL SCAR OF BREAST: Primary | ICD-10-CM

## 2020-08-12 DIAGNOSIS — D21.9 LEIOMYOMA: ICD-10-CM

## 2020-08-12 PROCEDURE — 76830 TRANSVAGINAL US NON-OB: CPT | Performed by: OBSTETRICS & GYNECOLOGY

## 2020-08-12 PROCEDURE — 99213 OFFICE O/P EST LOW 20 MIN: CPT | Performed by: OBSTETRICS & GYNECOLOGY

## 2020-08-12 NOTE — PROGRESS NOTES
"Chief complaint:history of PCOS    HPI  Hector Gilbert is a 40 y.o. female presents for office f/u and u/s due to history of PCOS. She reports her menses have been Q 25 days since her last visit with normal flow. She denies pelvic pain or excessive bleeding.    She also had excision of radial scar with Dr. Mccauley. Her path was benign. She reports she has a f/u appt with him later in year and he advised annual mammogram.         The following portions of the patient's history were reviewed and updated as appropriate: allergies, current medications, past family history, past medical history, past social history, past surgical history and problem list.    Review of Systems  Constitutional: negative for fevers  Respiratory: negative  Genitourinary:negative for irregualr menses or abnormal flow  Integument/breast: pos for occasional discomfort at surgical site    /70   Ht 167.6 cm (66\")   Wt 119 kg (263 lb 3.2 oz)   LMP 08/11/2020 (Exact Date)   Breastfeeding No   BMI 42.48 kg/m²         Physical Exam   Constitutional: She is oriented to person, place, and time. She appears well-developed and well-nourished.   Pulmonary/Chest: Effort normal.   Genitourinary:   Genitourinary Comments: No tenderness noted with tv imaging of uterus/adnexa   Neurological: She is alert and oriented to person, place, and time.   Skin: Skin is warm and dry.   Psychiatric: She has a normal mood and affect. Her behavior is normal.     Tvu/s: uterus with 2 fibroids; posterior subserosal measures 2.3 X 1.8 X 2.8 cm; anterior fibroid measures 1.6 X 1.3 X 2.0 cm. Ovaries appear polycystic but otherwise normal. Endometrium is thin at 2.4 mm.      Hector was seen today for follow-up.    Diagnoses and all orders for this visit:    Radial scar of breast  -     Mammo Screening Digital Tomosynthesis Bilateral With CAD; Future    History of irregular menstrual bleeding    Screening for breast cancer  -     Mammo Screening Digital Tomosynthesis " Bilateral With CAD; Future    Leiomyoma    PCOS (polycystic ovarian syndrome)      Pt is having regular menses with observation. She has a history of PCOS and is aware to call with abnormal or missed menses. She declines contraception    An additional fibroid is noted and also appears subserosal on posterior surface. Pt is asymptomatic so plan observation only    Radial scar: pt s/p excision. Ordered screening mammogram for 11/2020 when she is due. She will f/u with breast surgeon as well.    rtc for annual in November when due

## 2020-12-17 ENCOUNTER — PROCEDURE VISIT (OUTPATIENT)
Dept: OBSTETRICS AND GYNECOLOGY | Age: 40
End: 2020-12-17

## 2020-12-17 ENCOUNTER — APPOINTMENT (OUTPATIENT)
Dept: WOMENS IMAGING | Facility: HOSPITAL | Age: 40
End: 2020-12-17

## 2020-12-17 DIAGNOSIS — Z12.31 VISIT FOR SCREENING MAMMOGRAM: Primary | ICD-10-CM

## 2020-12-17 PROCEDURE — 77067 SCR MAMMO BI INCL CAD: CPT | Performed by: OBSTETRICS & GYNECOLOGY

## 2020-12-17 PROCEDURE — 77067 SCR MAMMO BI INCL CAD: CPT | Performed by: RADIOLOGY

## 2020-12-23 ENCOUNTER — APPOINTMENT (OUTPATIENT)
Dept: WOMENS IMAGING | Facility: HOSPITAL | Age: 40
End: 2020-12-23

## 2021-03-02 ENCOUNTER — TELEPHONE (OUTPATIENT)
Dept: OBSTETRICS AND GYNECOLOGY | Age: 41
End: 2021-03-02

## 2021-03-02 NOTE — TELEPHONE ENCOUNTER
Mammogram result obtained and benign. Letter was mailed to patient 12/28/2020 with review of chart but report was not scanned to [facility]. Will scan report now. Called pt to let her know.

## 2021-08-27 ENCOUNTER — OFFICE VISIT (OUTPATIENT)
Dept: OBSTETRICS AND GYNECOLOGY | Age: 41
End: 2021-08-27

## 2021-08-27 VITALS
WEIGHT: 258 LBS | BODY MASS INDEX: 41.46 KG/M2 | DIASTOLIC BLOOD PRESSURE: 80 MMHG | SYSTOLIC BLOOD PRESSURE: 124 MMHG | HEIGHT: 66 IN

## 2021-08-27 DIAGNOSIS — D21.9 LEIOMYOMA: ICD-10-CM

## 2021-08-27 DIAGNOSIS — N94.6 DYSMENORRHEA: ICD-10-CM

## 2021-08-27 DIAGNOSIS — Z13.0 SCREENING FOR IRON DEFICIENCY ANEMIA: ICD-10-CM

## 2021-08-27 DIAGNOSIS — Z01.419 ENCOUNTER FOR GYNECOLOGICAL EXAMINATION: Primary | ICD-10-CM

## 2021-08-27 DIAGNOSIS — N64.4 BREAST PAIN, LEFT: ICD-10-CM

## 2021-08-27 DIAGNOSIS — Z11.51 ENCOUNTER FOR SCREENING FOR HUMAN PAPILLOMAVIRUS (HPV): ICD-10-CM

## 2021-08-27 LAB
ERYTHROCYTE [DISTWIDTH] IN BLOOD BY AUTOMATED COUNT: 14.5 % (ref 12.3–15.4)
HCT VFR BLD AUTO: 39.1 % (ref 34–46.6)
HGB BLD-MCNC: 12.3 G/DL (ref 12–15.9)
MCH RBC QN AUTO: 26.8 PG (ref 26.6–33)
MCHC RBC AUTO-ENTMCNC: 31.5 G/DL (ref 31.5–35.7)
MCV RBC AUTO: 85.2 FL (ref 79–97)
PLATELET # BLD AUTO: 388 10*3/MM3 (ref 140–450)
RBC # BLD AUTO: 4.59 10*6/MM3 (ref 3.77–5.28)
WBC # BLD AUTO: 4.58 10*3/MM3 (ref 3.4–10.8)

## 2021-08-27 PROCEDURE — 76641 ULTRASOUND BREAST COMPLETE: CPT | Performed by: OBSTETRICS & GYNECOLOGY

## 2021-08-27 PROCEDURE — 99396 PREV VISIT EST AGE 40-64: CPT | Performed by: OBSTETRICS & GYNECOLOGY

## 2021-08-27 PROCEDURE — 99213 OFFICE O/P EST LOW 20 MIN: CPT | Performed by: OBSTETRICS & GYNECOLOGY

## 2021-08-27 RX ORDER — IRON,CARBONYL 15 MG
TABLET,CHEWABLE ORAL
COMMUNITY
Start: 2021-03-03 | End: 2021-08-27 | Stop reason: SDUPTHER

## 2021-08-27 RX ORDER — DOXYCYCLINE HYCLATE 50 MG/1
CAPSULE, GELATIN COATED ORAL
COMMUNITY
Start: 2021-06-25

## 2021-08-27 RX ORDER — ROSUVASTATIN CALCIUM 10 MG/1
TABLET, COATED ORAL
COMMUNITY

## 2021-08-27 NOTE — PROGRESS NOTES
.  Subjective     Chief Complaint   Patient presents with   • Follow-up     c/o painful menses (stabbing feeling)       History of Present Illness    Hector Gilbert is a 41 y.o.  who presents for dysmenorrhea. She would also like to proceed with annual as she is overdue.  Her menses are usually regular every 25 to 28 days days, lasting >7 days, dysmenorrhea moderate, occurring first 1-2 days of flow with heavier flow.   Flow is become longer and heavier the last few cycles and flow has varied more than in past.     She also notes she is having left breast pain before her cycle. The pain is focal in medial breast. She denies mass or fever chills.       Obstetric History:  OB History        0    Para   0    Term   0       0    AB   0    Living   0       SAB   0    TAB   0    Ectopic   0    Molar        Multiple   0    Live Births                   Menstrual History:     Patient's last menstrual period was 2021 (exact date).         Current contraception: abstinence  History of abnormal Pap smear: no  Received Gardasil immunization: no  Perform regular self breast exam: yes - reg  Family history of uterine or ovarian cancer: no  Family History of colon cancer: yes - mat cousin at 42  Family history of breast cancer: no    Mammogram: ordered.  Colonoscopy: pt scheduled for next month  DEXA: not indicated.    Exercise: moderately active  Calcium/Vitamin D: adequate intake    The following portions of the patient's history were reviewed and updated as appropriate: allergies, current medications, past family history, past medical history, past social history, past surgical history and problem list.    Review of Systems    Review of Systems   Constitutional: Negative for fatigue.   Respiratory: Negative for shortness of breath.    Gastrointestinal: Negative for abdominal pain.   Genitourinary: positive for heavier, longer menses with pain   Neurological: Negative for headaches.  "  Psychiatric/Behavioral: Negative for dysphoric mood.         Objective   Physical Exam    /80   Ht 167.6 cm (66\")   Wt 117 kg (258 lb)   LMP 08/18/2021 (Exact Date)   Breastfeeding No   BMI 41.64 kg/m²   General:   alert and no distress   Heart: regular rate and rhythm   Lungs: clear to auscultation bilaterally   Breast: Inspection: right breast with 5 mm papule on medial surface consistent with ingrown hair. Left with healed scar from prior excision. Right breast with 5 mm skin papule, no masses, retractions, nipple discharge or axillary adenopathy noted. Left breast with increased tissue in upper, outer quadrant without focal mass; no retractions, nipple discharge or axillary adenopathy noted.    Neck: Supple   Abdomen: soft, non-tender.No masses,  no organomegaly   Pelvis: External genitalia: normal general appearance  Urinary system: urethral meatus normal  Vaginal: normal mucosa without prolapse or lesions  Cervix: normal appearance  Adnexa: no masses or tenderness  Uterus: irregular and slightly enlarged   Extremities: Extremities normal, atraumatic, no cyanosis or edema   Neurologic: Alert and oriented   Psychiatric: Normal affect, judgement, and mood     Assessment/Plan   Diagnoses and all orders for this visit:    1. Encounter for gynecological examination (Primary)  -     IGP, Apt HPV,rfx 16 / 18,45    2. Dysmenorrhea  -     Chlamydia trachomatis, Neisseria gonorrhoeae, Trichomonas vaginalis, PCR - Swab, Vagina    3. Breast pain, left  -     Mammo Diagnostic Digital Tomosynthesis Left With CAD; Future  -     US Breast Left Complete; Future  -     Ambulatory Referral to Breast Surgery    4. Screening for iron deficiency anemia  -     CBC (No Diff); Future    5. Encounter for screening for human papillomavirus (HPV)  -     IGP, Apt HPV,rfx 16 / 18,45        All questions answered.  Breast self exam technique reviewed and patient encouraged to perform self-exam monthly.  Discussed healthy " lifestyle modifications.  Recommended 30 minutes of aerobic exercise five times per week.  Advised pt to call if she does not receive results of pap within 2 weeks    Recommend she schedule u/s and embx at f/u visit for further evaluation of symptoms.     She requests to see Dr. Mccarty regarding breast complaints as he did her prior surgery. Advised she call if she does not receive contact to book imaging / consult.

## 2021-08-30 LAB
C TRACH RRNA SPEC QL NAA+PROBE: NEGATIVE
N GONORRHOEA RRNA SPEC QL NAA+PROBE: NEGATIVE
T VAGINALIS DNA SPEC QL NAA+PROBE: NEGATIVE

## 2021-09-02 NOTE — PROGRESS NOTES
"Chief complaint:heavy menses, fibroids    HPI  Hector Gilbert is a 41 y.o. female presents for embx.         The following portions of the patient's history were reviewed and updated as appropriate: allergies, current medications, past family history, past medical history, past social history, past surgical history and problem list.    Review of Systems  Pertinent items are noted in HPI.    /70   Ht 167.6 cm (66\")   Wt 118 kg (259 lb 9.6 oz)   LMP 08/18/2021 (Exact Date)   Breastfeeding No   BMI 41.90 kg/m²         Physical Exam  Constitutional:       Appearance: Normal appearance.   Neurological:      Mental Status: She is alert.   Psychiatric:         Mood and Affect: Mood normal.         Behavior: Behavior normal.     tv u/s: uterus with 3 fibroids, largest 4.1 X 2.7 X 3.4 cm. Normal ovaries. Endometrium 8 mm without focal lesions        Diagnoses and all orders for this visit:    1. Menorrhagia with regular cycle (Primary)  -     Reference Histopathology    2. Dysmenorrhea    3. Pre-procedure lab exam  -     POC Pregnancy, Urine    4. Leiomyoma        Discussed additional fibroid noted with today's u/s so now 3 total. Reviewed this may be source of symptoms and provided information from [employer]G on fibroids. Discussed treatment options include medical treatment with progestins in various formuations, or surgical options such as myomectomy, endometrial ablation, uterine artery embolization and hysterectomy. Pt reports she does not desire future pregnancy. Pt aware she can observe if preferred.       .Endometrial Biopsy Procedure Note    Pre-operative Diagnosis: menorrhagia    Post-operative Diagnosis: same    Indications: menorrhagia    Procedure Details    Urine pregnancy test was done and was NEGATIVE .  The risks (including infection, bleeding, pain, and uterine perforation) and benefits of the procedure were explained to the patient and verbal and written informed consent was obtained.        The " patient was placed in the dorsal lithotomy position.  A speculum inserted in the vagina, and the cervix prepped with povidone iodine X 3.      A sharp tenaculum was applied to the anterior lip of the cervix for stabilization.  A Pipelle was used to sound the uterus to a depth of 8cm and sample the endometrium using sterile technique.  Sample was sent for pathologic examination.    Condition:  Stable    Complications:  None  Patient tolerated the procedure well without complications.    Plan:    The patient was advised to call for any fever or for prolonged or severe pain or bleeding.   Advised pt to call if she does not receive results of embx within 10 days.

## 2021-09-03 ENCOUNTER — OFFICE VISIT (OUTPATIENT)
Dept: OBSTETRICS AND GYNECOLOGY | Age: 41
End: 2021-09-03

## 2021-09-03 VITALS
SYSTOLIC BLOOD PRESSURE: 120 MMHG | HEIGHT: 66 IN | WEIGHT: 259.6 LBS | BODY MASS INDEX: 41.72 KG/M2 | DIASTOLIC BLOOD PRESSURE: 70 MMHG

## 2021-09-03 DIAGNOSIS — N92.0 MENORRHAGIA WITH REGULAR CYCLE: Primary | ICD-10-CM

## 2021-09-03 DIAGNOSIS — N94.6 DYSMENORRHEA: ICD-10-CM

## 2021-09-03 DIAGNOSIS — D21.9 LEIOMYOMA: ICD-10-CM

## 2021-09-03 DIAGNOSIS — Z01.812 PRE-PROCEDURE LAB EXAM: ICD-10-CM

## 2021-09-03 PROCEDURE — 58100 BIOPSY OF UTERUS LINING: CPT | Performed by: OBSTETRICS & GYNECOLOGY

## 2021-09-03 PROCEDURE — 81025 URINE PREGNANCY TEST: CPT | Performed by: OBSTETRICS & GYNECOLOGY

## 2021-09-03 PROCEDURE — 99213 OFFICE O/P EST LOW 20 MIN: CPT | Performed by: OBSTETRICS & GYNECOLOGY

## 2021-09-08 ENCOUNTER — TELEPHONE (OUTPATIENT)
Dept: OBSTETRICS AND GYNECOLOGY | Age: 41
End: 2021-09-08

## 2021-09-08 LAB
DX ICD CODE: NORMAL
PATH REPORT.FINAL DX SPEC: NORMAL
PATH REPORT.GROSS SPEC: NORMAL
PATH REPORT.RELEVANT HX SPEC: NORMAL
PATH REPORT.SITE OF ORIGIN SPEC: NORMAL
PATHOLOGIST NAME: NORMAL
PAYMENT PROCEDURE: NORMAL

## 2021-09-08 NOTE — TELEPHONE ENCOUNTER
----- Message from Candice Walsh MD sent at 9/8/2021 11:22 AM EDT -----  Please call Hector, her embx is benign/normal

## 2021-09-08 NOTE — TELEPHONE ENCOUNTER
Pt notified of normal results. PT states she is still having traces of blood and informed after I spoke with Yessica that this is normal to experience. PT aware Dr Walsh back in office on Friday

## 2021-09-13 ENCOUNTER — TELEPHONE (OUTPATIENT)
Dept: OBSTETRICS AND GYNECOLOGY | Age: 41
End: 2021-09-13

## 2021-09-14 ENCOUNTER — TELEPHONE (OUTPATIENT)
Dept: OBSTETRICS AND GYNECOLOGY | Age: 41
End: 2021-09-14

## 2021-09-14 NOTE — TELEPHONE ENCOUNTER
Had to cancel her 8 am appt.this am due to a family emergency.You have nothing available this week.Can she see a NP/PA? Please advise.

## 2021-09-16 ENCOUNTER — OFFICE VISIT (OUTPATIENT)
Dept: OBSTETRICS AND GYNECOLOGY | Age: 41
End: 2021-09-16

## 2021-09-16 VITALS
BODY MASS INDEX: 42.43 KG/M2 | HEIGHT: 66 IN | DIASTOLIC BLOOD PRESSURE: 74 MMHG | WEIGHT: 264 LBS | SYSTOLIC BLOOD PRESSURE: 122 MMHG

## 2021-09-16 DIAGNOSIS — N89.8 VAGINAL DISCHARGE: Primary | ICD-10-CM

## 2021-09-16 DIAGNOSIS — N89.8 VAGINAL ODOR: ICD-10-CM

## 2021-09-16 PROCEDURE — 99214 OFFICE O/P EST MOD 30 MIN: CPT | Performed by: NURSE PRACTITIONER

## 2021-09-16 RX ORDER — METRONIDAZOLE 500 MG/1
500 TABLET ORAL 2 TIMES DAILY
Qty: 14 TABLET | Refills: 0 | Status: SHIPPED | OUTPATIENT
Start: 2021-09-16 | End: 2021-09-23

## 2021-09-16 RX ORDER — FLUCONAZOLE 150 MG/1
TABLET ORAL
Qty: 2 TABLET | Refills: 0 | Status: SHIPPED | OUTPATIENT
Start: 2021-09-16 | End: 2023-02-08

## 2021-09-16 NOTE — PROGRESS NOTES
"Subjective     Chief Complaint   Patient presents with   • Follow-up     from procedure, bx, odor and discharge       Hector Gilbert is a 41 y.o.  whose LMP is Patient's last menstrual period was 2021 (exact date).     Pt presents today with chief complaint of vaginal discharge with odor  She had an endometrial biopsy done on 9/3 and started with these symptoms shortly after  Her endometrial biopsy was benign  She used 1 boric acid suppository that she got OTC  She is still having discharge but hasn't noticed the odor as much   She was having bleeding after procedure but this has stopped   Pt of Dr. Walsh      No Additional Complaints Reported    The following portions of the patient's history were reviewed and updated as appropriate:vital signs, allergies, current medications, past medical history, past social history, past surgical history and problem list      Review of Systems   A comprehensive review of systems was negative except for: Genitourinary: positive for vaginal discharge with odor     Objective      /74   Ht 167.6 cm (66\")   Wt 120 kg (264 lb)   LMP 2021 (Exact Date)   BMI 42.61 kg/m²     Physical Exam    General:   alert and no distress   Heart: Not performed today   Lungs: Not performed today.   Breast: Not performed today   Neck: na   Abdomen: {Not performed today   CVA: Not performed today   Pelvis: External genitalia: normal general appearance  Urinary system: urethral meatus normal  Vaginal: normal mucosa without prolapse or lesions, normal without tenderness, induration or masses, normal rugae and discharge, thick white  Cervix: normal appearance   Extremities: Not performed today   Neurologic: negative   Psychiatric: Normal affect, judgement, and mood       Lab Review   Labs: No data reviewed     Imaging   No data reviewed    Assessment/Plan     ASSESSMENT  1. Vaginal discharge    2. Vaginal odor        PLAN  1.   Orders Placed This Encounter   Procedures   • " [facility] BV & Candida - Swab, Vagina       2. Medications prescribed this encounter:        New Medications Ordered This Visit   Medications   • metroNIDAZOLE (Flagyl) 500 MG tablet     Sig: Take 1 tablet by mouth 2 (Two) Times a Day for 7 days.     Dispense:  14 tablet     Refill:  0   • fluconazole (DIFLUCAN) 150 MG tablet     Sig: TAKE ONE TABLET TODAY AND REPEAT IN THREE DAYS     Dispense:  2 tablet     Refill:  0       3. Vaginal swab for BV and yeast. Will go ahead and treat based on patient symptoms and exam. Will call with results.     Follow up: MITCH Sanford  9/16/2021

## 2021-09-18 LAB
A VAGINAE DNA VAG QL NAA+PROBE: NORMAL SCORE
BVAB2 DNA VAG QL NAA+PROBE: NORMAL SCORE
C ALBICANS DNA VAG QL NAA+PROBE: NEGATIVE
C GLABRATA DNA VAG QL NAA+PROBE: NEGATIVE
MEGA1 DNA VAG QL NAA+PROBE: NORMAL SCORE

## 2021-09-24 ENCOUNTER — APPOINTMENT (OUTPATIENT)
Dept: WOMENS IMAGING | Facility: HOSPITAL | Age: 41
End: 2021-09-24

## 2021-09-24 PROCEDURE — 77061 BREAST TOMOSYNTHESIS UNI: CPT | Performed by: RADIOLOGY

## 2021-09-24 PROCEDURE — 77065 DX MAMMO INCL CAD UNI: CPT | Performed by: RADIOLOGY

## 2021-09-24 PROCEDURE — G0279 TOMOSYNTHESIS, MAMMO: HCPCS | Performed by: RADIOLOGY

## 2021-09-24 PROCEDURE — 76641 ULTRASOUND BREAST COMPLETE: CPT | Performed by: RADIOLOGY

## 2021-10-07 NOTE — TELEPHONE ENCOUNTER
Last OV with Sharron was 6/11/2021with b/p 128/76    bp 7/30/2021 at time of GI procedure was 143/66         Spoke to Sola and she DID have caffeine yesterday before appt and was alittle nervous going to see specialist.  Denies smoking  No chest pain or palpitations.    Sola offered to check her b/p's at home and send a note to Sharron in new days to show b/p is under control.   Pt notified

## 2022-02-07 ENCOUNTER — APPOINTMENT (OUTPATIENT)
Dept: WOMENS IMAGING | Facility: HOSPITAL | Age: 42
End: 2022-02-07

## 2022-02-07 PROCEDURE — 77063 BREAST TOMOSYNTHESIS BI: CPT | Performed by: RADIOLOGY

## 2022-02-07 PROCEDURE — 77067 SCR MAMMO BI INCL CAD: CPT | Performed by: RADIOLOGY

## 2022-07-07 ENCOUNTER — APPOINTMENT (OUTPATIENT)
Dept: ULTRASOUND IMAGING | Facility: HOSPITAL | Age: 42
End: 2022-07-07

## 2022-07-07 ENCOUNTER — HOSPITAL ENCOUNTER (EMERGENCY)
Facility: HOSPITAL | Age: 42
Discharge: HOME | End: 2022-07-07
Attending: EMERGENCY MEDICINE | Admitting: EMERGENCY MEDICINE

## 2022-07-07 VITALS
BODY MASS INDEX: 41.78 KG/M2 | SYSTOLIC BLOOD PRESSURE: 141 MMHG | HEART RATE: 84 BPM | HEIGHT: 66 IN | WEIGHT: 260 LBS | TEMPERATURE: 99.2 F | DIASTOLIC BLOOD PRESSURE: 92 MMHG | RESPIRATION RATE: 16 BRPM | OXYGEN SATURATION: 98 %

## 2022-07-07 DIAGNOSIS — R10.13 EPIGASTRIC ABDOMINAL PAIN: Primary | ICD-10-CM

## 2022-07-07 DIAGNOSIS — U07.1 COVID-19: ICD-10-CM

## 2022-07-07 DIAGNOSIS — R82.71 BACTERIURIA: ICD-10-CM

## 2022-07-07 LAB
ALBUMIN SERPL-MCNC: 4 G/DL (ref 3.5–5.2)
ALBUMIN/GLOB SERPL: 1.2 G/DL
ALP SERPL-CCNC: 84 U/L (ref 39–117)
ALT SERPL W P-5'-P-CCNC: 6 U/L (ref 1–33)
ANION GAP SERPL CALCULATED.3IONS-SCNC: 10 MMOL/L (ref 5–15)
AST SERPL-CCNC: 8 U/L (ref 1–32)
BACTERIA UR QL AUTO: ABNORMAL /HPF
BASOPHILS # BLD AUTO: 0.04 10*3/MM3 (ref 0–0.2)
BASOPHILS NFR BLD AUTO: 0.4 % (ref 0–1.5)
BILIRUB SERPL-MCNC: 0.4 MG/DL (ref 0–1.2)
BILIRUB UR QL STRIP: NEGATIVE
BUN SERPL-MCNC: 6 MG/DL (ref 6–20)
BUN/CREAT SERPL: 7.9 (ref 7–25)
CALCIUM SPEC-SCNC: 8.8 MG/DL (ref 8.6–10.5)
CHLORIDE SERPL-SCNC: 102 MMOL/L (ref 98–107)
CLARITY UR: ABNORMAL
CO2 SERPL-SCNC: 27 MMOL/L (ref 22–29)
COLOR UR: ABNORMAL
CREAT SERPL-MCNC: 0.76 MG/DL (ref 0.57–1)
DEPRECATED RDW RBC AUTO: 41 FL (ref 37–54)
EGFRCR SERPLBLD CKD-EPI 2021: 101.1 ML/MIN/1.73
EOSINOPHIL # BLD AUTO: 0.1 10*3/MM3 (ref 0–0.4)
EOSINOPHIL NFR BLD AUTO: 1 % (ref 0.3–6.2)
ERYTHROCYTE [DISTWIDTH] IN BLOOD BY AUTOMATED COUNT: 14.2 % (ref 12.3–15.4)
GLOBULIN UR ELPH-MCNC: 3.3 GM/DL
GLUCOSE SERPL-MCNC: 98 MG/DL (ref 65–99)
GLUCOSE UR STRIP-MCNC: NEGATIVE MG/DL
HCG SERPL QL: NEGATIVE
HCT VFR BLD AUTO: 36.4 % (ref 34–46.6)
HGB BLD-MCNC: 12.7 G/DL (ref 12–15.9)
HGB UR QL STRIP.AUTO: ABNORMAL
HOLD SPECIMEN: NORMAL
HYALINE CASTS UR QL AUTO: ABNORMAL /LPF
IMM GRANULOCYTES # BLD AUTO: 0.06 10*3/MM3 (ref 0–0.05)
IMM GRANULOCYTES NFR BLD AUTO: 0.6 % (ref 0–0.5)
KETONES UR QL STRIP: ABNORMAL
LEUKOCYTE ESTERASE UR QL STRIP.AUTO: ABNORMAL
LIPASE SERPL-CCNC: 23 U/L (ref 13–60)
LYMPHOCYTES # BLD AUTO: 2.12 10*3/MM3 (ref 0.7–3.1)
LYMPHOCYTES NFR BLD AUTO: 20.3 % (ref 19.6–45.3)
MCH RBC QN AUTO: 28.3 PG (ref 26.6–33)
MCHC RBC AUTO-ENTMCNC: 34.9 G/DL (ref 31.5–35.7)
MCV RBC AUTO: 81.3 FL (ref 79–97)
MONOCYTES # BLD AUTO: 0.73 10*3/MM3 (ref 0.1–0.9)
MONOCYTES NFR BLD AUTO: 7 % (ref 5–12)
NEUTROPHILS NFR BLD AUTO: 7.4 10*3/MM3 (ref 1.7–7)
NEUTROPHILS NFR BLD AUTO: 70.7 % (ref 42.7–76)
NITRITE UR QL STRIP: POSITIVE
NRBC BLD AUTO-RTO: 0 /100 WBC (ref 0–0.2)
PH UR STRIP.AUTO: 6 [PH] (ref 5–8)
PLATELET # BLD AUTO: 328 10*3/MM3 (ref 140–450)
PMV BLD AUTO: 8.9 FL (ref 6–12)
POTASSIUM SERPL-SCNC: 3.7 MMOL/L (ref 3.5–5.2)
PROT SERPL-MCNC: 7.3 G/DL (ref 6–8.5)
PROT UR QL STRIP: ABNORMAL
RBC # BLD AUTO: 4.48 10*6/MM3 (ref 3.77–5.28)
RBC # UR STRIP: ABNORMAL /HPF
REF LAB TEST METHOD: ABNORMAL
SODIUM SERPL-SCNC: 139 MMOL/L (ref 136–145)
SP GR UR STRIP: 1.01 (ref 1–1.03)
SQUAMOUS #/AREA URNS HPF: ABNORMAL /HPF
UROBILINOGEN UR QL STRIP: ABNORMAL
WBC # UR STRIP: ABNORMAL /HPF
WBC NRBC COR # BLD: 10.45 10*3/MM3 (ref 3.4–10.8)
WHOLE BLOOD HOLD COAG: NORMAL
WHOLE BLOOD HOLD SPECIMEN: NORMAL

## 2022-07-07 PROCEDURE — 80053 COMPREHEN METABOLIC PANEL: CPT | Performed by: EMERGENCY MEDICINE

## 2022-07-07 PROCEDURE — 81001 URINALYSIS AUTO W/SCOPE: CPT | Performed by: EMERGENCY MEDICINE

## 2022-07-07 PROCEDURE — 84703 CHORIONIC GONADOTROPIN ASSAY: CPT | Performed by: EMERGENCY MEDICINE

## 2022-07-07 PROCEDURE — 25010000002 ONDANSETRON PER 1 MG: Performed by: EMERGENCY MEDICINE

## 2022-07-07 PROCEDURE — 96374 THER/PROPH/DIAG INJ IV PUSH: CPT

## 2022-07-07 PROCEDURE — 85025 COMPLETE CBC W/AUTO DIFF WBC: CPT | Performed by: EMERGENCY MEDICINE

## 2022-07-07 PROCEDURE — 99283 EMERGENCY DEPT VISIT LOW MDM: CPT

## 2022-07-07 PROCEDURE — 83690 ASSAY OF LIPASE: CPT | Performed by: EMERGENCY MEDICINE

## 2022-07-07 PROCEDURE — 76705 ECHO EXAM OF ABDOMEN: CPT

## 2022-07-07 RX ORDER — LOSARTAN POTASSIUM 50 MG/1
50 TABLET ORAL DAILY
COMMUNITY
Start: 2022-05-23

## 2022-07-07 RX ORDER — ONDANSETRON 4 MG/1
4 TABLET, ORALLY DISINTEGRATING ORAL EVERY 8 HOURS PRN
Qty: 15 TABLET | Refills: 0 | Status: SHIPPED | OUTPATIENT
Start: 2022-07-07

## 2022-07-07 RX ORDER — ONDANSETRON 2 MG/ML
4 INJECTION INTRAMUSCULAR; INTRAVENOUS ONCE
Status: COMPLETED | OUTPATIENT
Start: 2022-07-07 | End: 2022-07-07

## 2022-07-07 RX ORDER — SODIUM CHLORIDE 0.9 % (FLUSH) 0.9 %
10 SYRINGE (ML) INJECTION AS NEEDED
Status: DISCONTINUED | OUTPATIENT
Start: 2022-07-07 | End: 2022-07-07 | Stop reason: HOSPADM

## 2022-07-07 RX ORDER — ONDANSETRON 4 MG/1
4 TABLET, FILM COATED ORAL 3 TIMES DAILY
COMMUNITY
Start: 2022-06-15 | End: 2022-07-15

## 2022-07-07 RX ORDER — DICYCLOMINE HCL 20 MG
20 TABLET ORAL EVERY 6 HOURS PRN
Qty: 30 TABLET | Refills: 0 | Status: SHIPPED | OUTPATIENT
Start: 2022-07-07 | End: 2023-02-08

## 2022-07-07 RX ADMIN — ONDANSETRON 4 MG: 2 INJECTION INTRAMUSCULAR; INTRAVENOUS at 21:11

## 2022-07-07 RX ADMIN — SODIUM CHLORIDE 1000 ML: 9 INJECTION, SOLUTION INTRAVENOUS at 21:11

## 2022-07-07 NOTE — ED TRIAGE NOTES
Pt c/o lower and upper R quadrant ABD pain since monday 7/4. Pt reports COVID + last Friday 7/1 .    Pt noted to have mask on when this RN entered the room.  This RN wore appropriate PPE throughout our encounter. Hand hygiene performed upon entering and exiting room.

## 2022-07-07 NOTE — ED PROVIDER NOTES
EMERGENCY DEPARTMENT ENCOUNTER    Room Number:  35/35  Date of encounter:  7/7/2022  PCP: Loren Almonte MD  Historian: Patient      HPI:  Chief Complaint: Abdominal pain  A complete HPI/ROS/PMH/PSH/SH/FH are unobtainable due to: Nothing    Context: Hector Gilbert is a 42 y.o. female who presents to the ED c/o severe epigastric abdominal pain that radiates to the right upper quadrant through to the back for the last 4 days now.  It is intermittent, sharp and stabbing, associated with nausea and vomiting.  No fever, no diarrhea.    Patient was diagnosed with COVID-19 about a week ago and had only mild symptoms.  She was prescribed Paxlovid by her PCP and took the first dose on Monday.  She said that her symptoms began about 20 to 30 minutes after she took her first dose and have persisted.  She has not taken any other doses.  Her COVID-19 symptoms have remained very minimal, with no fever, only mild nonproductive cough, but the abdominal pain has been an ongoing problem    She was seen at another emergency department yesterday for the same symptoms and a work-up was unremarkable.  This was done at [facility] and she said there was no imaging done and she is concerned about her gallbladder based on a conversation with a friend of hers.      PAST MEDICAL HISTORY  Active Ambulatory Problems     Diagnosis Date Noted   • Leiomyoma 05/12/2017   • S/P lumbar laminectomy 08/07/2018   • Neurofibroma 07/12/2018   • Lumbar spondylosis with myelopathy 07/12/2018   • Lumbar spine tumor 08/07/2018   • Intradural tumor 07/20/2018   • Abnormal mammogram 12/12/2019   • Radial scar of breast 01/22/2020   • PCOS (polycystic ovarian syndrome) 08/12/2020     Resolved Ambulatory Problems     Diagnosis Date Noted   • No Resolved Ambulatory Problems     Past Medical History:   Diagnosis Date   • Arthritis    • Gastroparesis    • Polycystic ovary syndrome          PAST SURGICAL HISTORY  Past Surgical History:   Procedure  Laterality Date   • CARPAL TUNNEL RELEASE Right 04/2021   • CARPAL TUNNEL RELEASE Left 03/2021   • SPINAL FIXATION SURGERY     • SPINE SURGERY           FAMILY HISTORY  Family History   Problem Relation Age of Onset   • Hypertension Mother    • Multiple myeloma Maternal Aunt    • Colon cancer Paternal Cousin 42   • Coronary artery disease Maternal Grandmother    • Diabetes Maternal Grandmother    • No Known Problems Father    • No Known Problems Maternal Uncle    • No Known Problems Paternal Aunt    • No Known Problems Paternal Uncle    • Ovarian cancer Neg Hx    • Uterine cancer Neg Hx    • Melanoma Neg Hx    • Prostate cancer Neg Hx          SOCIAL HISTORY  Social History     Socioeconomic History   • Marital status: Single   • Number of children: 0   Tobacco Use   • Smoking status: Never Smoker   • Smokeless tobacco: Never Used   Substance and Sexual Activity   • Alcohol use: Never   • Drug use: No   • Sexual activity: Not Currently     Partners: Male     Birth control/protection: Abstinence         ALLERGIES  Patient has no known allergies.        REVIEW OF SYSTEMS  Review of Systems     All systems reviewed and negative except for those discussed in HPI.       PHYSICAL EXAM    I have reviewed the triage vital signs and nursing notes.    ED Triage Vitals [07/07/22 1602]   Temp Heart Rate Resp BP SpO2   99.2 °F (37.3 °C) 118 18 (!) 137/103 93 %      Temp src Heart Rate Source Patient Position BP Location FiO2 (%)   Tympanic Monitor -- -- --       Physical Exam  GENERAL: Awake and alert, obese, uncomfortable appearing  HENT: nares patent  EYES: no scleral icterus  CV: regular rhythm, regular rate  RESPIRATORY: normal effort, CTA bilaterally with no respiratory distress  ABDOMEN: soft, nondistended with mild epigastric and right upper quadrant tenderness.  No rebound or guarding  MUSCULOSKELETAL: no deformity, no calf tenderness or pedal edema  NEURO: alert, moves all extremities, follows commands  SKIN: warm,  dry        LAB RESULTS  Recent Results (from the past 24 hour(s))   Urinalysis With Microscopic If Indicated (No Culture) - Urine, Clean Catch    Collection Time: 07/07/22  6:34 PM    Specimen: Urine, Clean Catch   Result Value Ref Range    Color, UA Dark Yellow (A) Yellow, Straw    Appearance, UA Cloudy (A) Clear    pH, UA 6.0 5.0 - 8.0    Specific Gravity, UA 1.013 1.005 - 1.030    Glucose, UA Negative Negative    Ketones, UA Trace (A) Negative    Bilirubin, UA Negative Negative    Blood, UA Large (3+) (A) Negative    Protein, UA Trace (A) Negative    Leuk Esterase, UA Trace (A) Negative    Nitrite, UA Positive (A) Negative    Urobilinogen, UA 1.0 E.U./dL 0.2 - 1.0 E.U./dL   Urinalysis, Microscopic Only - Urine, Clean Catch    Collection Time: 07/07/22  6:34 PM    Specimen: Urine, Clean Catch   Result Value Ref Range    RBC, UA 6-12 (A) None Seen, 0-2 /HPF    WBC, UA 0-2 None Seen, 0-2 /HPF    Bacteria, UA 2+ (A) None Seen /HPF    Squamous Epithelial Cells, UA 0-2 None Seen, 0-2 /HPF    Hyaline Casts, UA None Seen None Seen /LPF    Methodology Manual Light Microscopy    Comprehensive Metabolic Panel    Collection Time: 07/07/22  8:03 PM    Specimen: Arm, Right; Blood   Result Value Ref Range    Glucose 98 65 - 99 mg/dL    BUN 6 6 - 20 mg/dL    Creatinine 0.76 0.57 - 1.00 mg/dL    Sodium 139 136 - 145 mmol/L    Potassium 3.7 3.5 - 5.2 mmol/L    Chloride 102 98 - 107 mmol/L    CO2 27.0 22.0 - 29.0 mmol/L    Calcium 8.8 8.6 - 10.5 mg/dL    Total Protein 7.3 6.0 - 8.5 g/dL    Albumin 4.00 3.50 - 5.20 g/dL    ALT (SGPT) 6 1 - 33 U/L    AST (SGOT) 8 1 - 32 U/L    Alkaline Phosphatase 84 39 - 117 U/L    Total Bilirubin 0.4 0.0 - 1.2 mg/dL    Globulin 3.3 gm/dL    A/G Ratio 1.2 g/dL    BUN/Creatinine Ratio 7.9 7.0 - 25.0    Anion Gap 10.0 5.0 - 15.0 mmol/L    eGFR 101.1 >60.0 mL/min/1.73   Lipase    Collection Time: 07/07/22  8:03 PM    Specimen: Arm, Right; Blood   Result Value Ref Range    Lipase 23 13 - 60 U/L   Green  Top (Gel)    Collection Time: 07/07/22  8:03 PM   Result Value Ref Range    Extra Tube Hold for add-ons.    Lavender Top    Collection Time: 07/07/22  8:03 PM   Result Value Ref Range    Extra Tube hold for add-on    Light Blue Top    Collection Time: 07/07/22  8:03 PM   Result Value Ref Range    Extra Tube Hold for add-ons.    CBC Auto Differential    Collection Time: 07/07/22  8:03 PM    Specimen: Arm, Right; Blood   Result Value Ref Range    WBC 10.45 3.40 - 10.80 10*3/mm3    RBC 4.48 3.77 - 5.28 10*6/mm3    Hemoglobin 12.7 12.0 - 15.9 g/dL    Hematocrit 36.4 34.0 - 46.6 %    MCV 81.3 79.0 - 97.0 fL    MCH 28.3 26.6 - 33.0 pg    MCHC 34.9 31.5 - 35.7 g/dL    RDW 14.2 12.3 - 15.4 %    RDW-SD 41.0 37.0 - 54.0 fl    MPV 8.9 6.0 - 12.0 fL    Platelets 328 140 - 450 10*3/mm3    Neutrophil % 70.7 42.7 - 76.0 %    Lymphocyte % 20.3 19.6 - 45.3 %    Monocyte % 7.0 5.0 - 12.0 %    Eosinophil % 1.0 0.3 - 6.2 %    Basophil % 0.4 0.0 - 1.5 %    Immature Grans % 0.6 (H) 0.0 - 0.5 %    Neutrophils, Absolute 7.40 (H) 1.70 - 7.00 10*3/mm3    Lymphocytes, Absolute 2.12 0.70 - 3.10 10*3/mm3    Monocytes, Absolute 0.73 0.10 - 0.90 10*3/mm3    Eosinophils, Absolute 0.10 0.00 - 0.40 10*3/mm3    Basophils, Absolute 0.04 0.00 - 0.20 10*3/mm3    Immature Grans, Absolute 0.06 (H) 0.00 - 0.05 10*3/mm3    nRBC 0.0 0.0 - 0.2 /100 WBC   hCG, Serum, Qualitative    Collection Time: 07/07/22  8:03 PM    Specimen: Arm, Right; Blood   Result Value Ref Range    HCG Qualitative Negative Negative       Ordered the above labs and independently reviewed the results.        RADIOLOGY  US Gallbladder    Result Date: 7/7/2022  RIGHT UPPER QUADRANT SONOGRAM  HISTORY: Right upper quadrant abdominal pain and nausea.  TECHNIQUE: Right upper quadrant sonogram was performed in standard fashion.  FINDINGS: The visualized portions of the liver, pancreas, and right kidney appear normal. The kidney measures 11.2 x 4.3 x 5.0 cm.  The gallbladder is in situ and  appears normal. There is no sonographic Grove's sign. Common bile duct measures 4 mm.      Negative.  This report was finalized on 7/7/2022 10:03 PM by Dr. Cholo Hung M.D.        I ordered the above noted radiological studies. Reviewed by me and discussed with radiologist.  See dictation for official radiology interpretation.      PROCEDURES    Procedures      MEDICATIONS GIVEN IN ER    Medications   sodium chloride 0.9 % flush 10 mL (has no administration in time range)   ondansetron (ZOFRAN) injection 4 mg (4 mg Intravenous Given 7/7/22 2111)   sodium chloride 0.9 % bolus 1,000 mL (0 mL Intravenous Stopped 7/7/22 2153)         PROGRESS, DATA ANALYSIS, CONSULTS, AND MEDICAL DECISION MAKING    All labs have been independently reviewed by me.  All radiology studies have been reviewed by me and discussed with radiologist dictating the report.   EKG's independently viewed and interpreted by me.  Discussion below represents my analysis of pertinent findings related to patient's condition, differential diagnosis, treatment plan and final disposition.        ED Course as of 07/07/22 2223   Thu Jul 07, 2022   2221 CBC and chemistry again unremarkable as compared to labs from yesterday. [DP]   2221 Lipase normal and hCG is negative [DP]   2221 Urinalysis has 2+ bacteria but no white cells, and no symptoms of UTI according to the patient.  I had a conversation with her about this and opted not to treat at this point so as not to exacerbate the abdominal discomfort.  We will culture and follow-up on the results.  This was a clean-catch and could certainly be a contaminant.  Further, her upper abdominal pain I do not believe is related at all to bacteria in the urine [DP]   2222 Right upper quadrant ultrasound was performed which showed no gallstones, no ductal dilatation and no pericholecystic inflammation. [DP]   2223 I have discussed with the patient at length that she can follow-up in the outpatient setting with her  PCP.  We did discuss the urine culture that would be done, and advised that if her symptoms persist that a HIDA scan might be the next best thing for her PCP to order [DP]      ED Course User Index  [DP] Derek Butcher MD           PPE: The patient wore a surgical mask throughout the entire patient encounter. I wore an N95, eye protection    AS OF 22:23 EDT VITALS:    BP - 141/92  HR - 84  TEMP - 99.2 °F (37.3 °C) (Tympanic)  O2 SATS - 98%        DIAGNOSIS  Final diagnoses:   Epigastric abdominal pain   Bacteriuria   COVID-19         DISPOSITION  Discharge           Derek Butcher MD  07/07/22 3803

## 2022-07-08 NOTE — DISCHARGE INSTRUCTIONS
We will culture your urine and call you if there is any infection to be treated.  Follow with your PCP later this week.  If your pain continues, a HIDA scan may be considered to further evaluate the gallbladder.

## 2022-09-22 ENCOUNTER — OFFICE VISIT (OUTPATIENT)
Dept: OBSTETRICS AND GYNECOLOGY | Age: 42
End: 2022-09-22

## 2022-09-22 VITALS
SYSTOLIC BLOOD PRESSURE: 120 MMHG | WEIGHT: 275.8 LBS | BODY MASS INDEX: 44.32 KG/M2 | HEIGHT: 66 IN | DIASTOLIC BLOOD PRESSURE: 82 MMHG

## 2022-09-22 DIAGNOSIS — Z01.419 WELL WOMAN EXAM WITH ROUTINE GYNECOLOGICAL EXAM: Primary | ICD-10-CM

## 2022-09-22 DIAGNOSIS — D21.9 FIBROIDS: ICD-10-CM

## 2022-09-22 DIAGNOSIS — Z12.31 ENCOUNTER FOR SCREENING MAMMOGRAM FOR MALIGNANT NEOPLASM OF BREAST: ICD-10-CM

## 2022-09-22 DIAGNOSIS — N39.3 SUI (STRESS URINARY INCONTINENCE, FEMALE): ICD-10-CM

## 2022-09-22 DIAGNOSIS — E66.01 MORBID OBESITY WITH BMI OF 40.0-44.9, ADULT: ICD-10-CM

## 2022-09-22 DIAGNOSIS — Z12.4 SCREENING FOR CERVICAL CANCER: ICD-10-CM

## 2022-09-22 DIAGNOSIS — N92.1 MENORRHAGIA WITH IRREGULAR CYCLE: ICD-10-CM

## 2022-09-22 PROCEDURE — 99396 PREV VISIT EST AGE 40-64: CPT | Performed by: NURSE PRACTITIONER

## 2022-09-22 NOTE — PROGRESS NOTES
Subjective     Chief Complaint   Patient presents with   • Gynecologic Exam     annual. last pap 21 (neg), last mg 22 (neg)       History of Present Illness    Hector Gilbert is a 42 y.o.  who presents for annual exam.    Doing well  UTD on mammogram  Periods are irregular and more prolonged  In august she bled for 30 days   Prior to august she would bleed around 12 days  She does take an iron supplement daily  Her last hemoglobin was normal in July  She has noted some urinary leaking with coughing and sneezing about 7 or 8 months ago  She does have fibroids   She is ready to do something about the bleeding - had previously discuss ablation   Pt of Dr. Hood    Obstetric History:  OB History        0    Para   0    Term   0       0    AB   0    Living   0       SAB   0    IAB   0    Ectopic   0    Molar        Multiple   0    Live Births                   Menstrual History:     Patient's last menstrual period was 2022 (exact date).         Current contraception: abstinence  History of abnormal Pap smear: no  Received Gardasil immunization: no  Perform regular self breast exam: yes - .  Family history of uterine or ovarian cancer: no  Family History of colon cancer: yes - paternal cousin  Family history of breast cancer: no    Mammogram: up to date.  Colonoscopy: not indicated.  DEXA: not indicated.    Exercise: moderately active  Calcium/Vitamin D: adequate intake    The following portions of the patient's history were reviewed and updated as appropriate: allergies, current medications, past family history, past medical history, past social history, past surgical history and problem list.    Review of Systems   Constitutional: Negative.    Respiratory: Negative.    Cardiovascular: Negative.    Gastrointestinal: Negative.    Genitourinary: Positive for menstrual problem.        JOHANN   Skin: Negative.    Psychiatric/Behavioral: Negative.            Objective   Physical  Exam  Constitutional:       General: She is awake.      Appearance: Normal appearance. She is well-developed. She is obese.   HENT:      Head: Normocephalic and atraumatic.      Nose: Nose normal.   Neck:      Thyroid: No thyroid mass, thyromegaly or thyroid tenderness.   Cardiovascular:      Rate and Rhythm: Normal rate and regular rhythm.      Pulses: Normal pulses.      Heart sounds: Normal heart sounds.   Pulmonary:      Effort: Pulmonary effort is normal.      Breath sounds: Normal breath sounds.   Chest:   Breasts: Breasts are symmetrical.      Right: Normal. No swelling, bleeding, inverted nipple, mass, nipple discharge, skin change, tenderness or supraclavicular adenopathy.      Left: Normal. No swelling, bleeding, inverted nipple, mass, nipple discharge, skin change, tenderness or supraclavicular adenopathy.       Abdominal:      General: Abdomen is flat. Bowel sounds are normal.      Palpations: Abdomen is soft.      Tenderness: There is no abdominal tenderness.   Genitourinary:     General: Normal vulva.      Labia:         Right: No rash, tenderness, lesion or injury.         Left: No rash, tenderness, lesion or injury.       Urethra: No prolapse, urethral pain, urethral swelling or urethral lesion.      Vagina: Normal. No signs of injury. No vaginal discharge, erythema, tenderness, bleeding, lesions or prolapsed vaginal walls.      Cervix: No discharge, friability, lesion, erythema or cervical bleeding.      Uterus: Normal. Not enlarged, not tender and no uterine prolapse.       Adnexa: Right adnexa normal and left adnexa normal.        Right: No mass, tenderness or fullness.          Left: No mass, tenderness or fullness.        Rectum: Normal. No mass.      Comments: Exam limited by body habitus   Musculoskeletal:      Cervical back: Normal range of motion and neck supple.   Lymphadenopathy:      Upper Body:      Right upper body: No supraclavicular adenopathy.      Left upper body: No supraclavicular  "adenopathy.   Skin:     General: Skin is warm and dry.   Neurological:      General: No focal deficit present.      Mental Status: She is alert and oriented to person, place, and time.   Psychiatric:         Mood and Affect: Mood normal.         Behavior: Behavior normal. Behavior is cooperative.         Thought Content: Thought content normal.         Judgment: Judgment normal.         /82   Ht 167.6 cm (66\")   Wt 125 kg (275 lb 12.8 oz)   LMP 08/31/2022 (Exact Date)   Breastfeeding No   BMI 44.52 kg/m²     Assessment & Plan   Diagnoses and all orders for this visit:    1. Well woman exam with routine gynecological exam (Primary)    2. Screening for cervical cancer  -     IGP, Apt HPV,rfx 16 / 18,45    3. Encounter for screening mammogram for malignant neoplasm of breast  -     Mammo screening digital tomosynthesis bilateral w CAD; Future    4. Menorrhagia with irregular cycle    5. JOHANN (stress urinary incontinence, female)    6. Fibroids    7. Morbid obesity with BMI of 40.0-44.9, adult (HCC)        All questions answered.  Breast self exam technique reviewed and patient encouraged to perform self-exam monthly.  Discussed healthy lifestyle modifications.  Recommended 30 minutes of aerobic exercise five times per week.  Discussed calcium needs to prevent osteoporosis.    -Pap today  -Mammo ordered - due in February  -Will plan repeat pelvic U/S and visit with Dr Walsh to discuss options for AUB               "

## 2022-09-30 LAB
CYTOLOGIST CVX/VAG CYTO: NORMAL
CYTOLOGY CVX/VAG DOC CYTO: NORMAL
CYTOLOGY CVX/VAG DOC THIN PREP: NORMAL
DX ICD CODE: NORMAL
HIV 1 & 2 AB SER-IMP: NORMAL
HPV I/H RISK 4 DNA CVX QL PROBE+SIG AMP: NEGATIVE
Lab: NORMAL
OTHER STN SPEC: NORMAL
STAT OF ADQ CVX/VAG CYTO-IMP: NORMAL

## 2022-10-11 ENCOUNTER — OFFICE VISIT (OUTPATIENT)
Dept: OBSTETRICS AND GYNECOLOGY | Age: 42
End: 2022-10-11

## 2022-10-11 VITALS
BODY MASS INDEX: 44.36 KG/M2 | DIASTOLIC BLOOD PRESSURE: 84 MMHG | SYSTOLIC BLOOD PRESSURE: 126 MMHG | HEIGHT: 66 IN | WEIGHT: 276 LBS

## 2022-10-11 DIAGNOSIS — N92.1 MENORRHAGIA WITH IRREGULAR CYCLE: Primary | ICD-10-CM

## 2022-10-11 DIAGNOSIS — N39.3 STRESS INCONTINENCE IN FEMALE: ICD-10-CM

## 2022-10-11 PROCEDURE — 99213 OFFICE O/P EST LOW 20 MIN: CPT | Performed by: OBSTETRICS & GYNECOLOGY

## 2022-10-11 NOTE — PROGRESS NOTES
"Chief Complaint   Patient presents with   • Follow-up     GYN F/U for AUB, U/S today, Pt would like to discuss ablation, pt has no complaints today         HPI  Hector Gilbert is a 42 y.o. female presents for f/u of irregular, heavy menses. She is interested in endometrial ablation. She reports flow has become very heavy at times. She also has noticed issues with stress incontinence becoming more frequent. She would like further evaluation.         The following portions of the patient's history were reviewed and updated as appropriate: allergies, current medications, past family history, past medical history, past social history, past surgical history and problem list.    Review of Systems  Pertinent items are noted in HPI.    /84   Ht 167.6 cm (66\")   Wt 125 kg (276 lb)   LMP 09/27/2022 (Exact Date)   BMI 44.55 kg/m²         Physical Exam  Constitutional:       Appearance: Normal appearance.   Pulmonary:      Effort: Pulmonary effort is normal.   Neurological:      General: No focal deficit present.      Mental Status: She is alert and oriented to person, place, and time.   Psychiatric:         Mood and Affect: Mood normal.         Behavior: Behavior normal.     tv u/s: uterus with 3 fibroids with stable size, normal ovaries        Diagnoses and all orders for this visit:    1. Menorrhagia with irregular cycle (Primary)    2. Stress incontinence in female  -     Ambulatory Referral to Gynecologic Urology      Reviewed endometrial ablation with pt. Advised she would need to also proceed with tubal sterilization. Pt was not aware the ablation did not provide contraception as well. She is not sure she desires lsc surgery as well as ablation. Discussed potential concerns related to increased risk of surgical complications and hx of PCOS. Also discussed increased risk of failure of ablation with fibroids. Recommend repeating embx as prior was over 1 year ago to assure no hyperplasia/atypia and pt notes " agreement. She reports procedure was painful previously and discussed options. Will schedule near cycle and consider cervical block. Discussed other methods to treat symptoms including various progestin options. Discussed risks including breast cancer with hormonal therapy. Pt will consider. Also discussed uterine artery embolization and lysteda. Handouts provided. Pt will review and plan further discussion at f/u visit.       31 min spent with pt, reviewing symptoms and providing counseling

## 2022-10-31 ENCOUNTER — OFFICE VISIT (OUTPATIENT)
Dept: OBSTETRICS AND GYNECOLOGY | Age: 42
End: 2022-10-31

## 2022-10-31 VITALS
WEIGHT: 272.6 LBS | DIASTOLIC BLOOD PRESSURE: 90 MMHG | SYSTOLIC BLOOD PRESSURE: 128 MMHG | BODY MASS INDEX: 43.81 KG/M2 | HEIGHT: 66 IN

## 2022-10-31 DIAGNOSIS — N92.0 MENORRHAGIA WITH REGULAR CYCLE: Primary | ICD-10-CM

## 2022-10-31 DIAGNOSIS — Z76.89 ENCOUNTER FOR BIOPSY: ICD-10-CM

## 2022-10-31 LAB
B-HCG UR QL: NEGATIVE
EXPIRATION DATE: NORMAL
INTERNAL NEGATIVE CONTROL: NEGATIVE
INTERNAL POSITIVE CONTROL: POSITIVE
Lab: NORMAL

## 2022-10-31 PROCEDURE — 58100 BIOPSY OF UTERUS LINING: CPT | Performed by: OBSTETRICS & GYNECOLOGY

## 2022-10-31 PROCEDURE — 81025 URINE PREGNANCY TEST: CPT | Performed by: OBSTETRICS & GYNECOLOGY

## 2022-10-31 NOTE — PROGRESS NOTES
.Endometrial Biopsy Procedure Note    Pre-operative Diagnosis: menorrhagia    Post-operative Diagnosis: same    Indications: menorrhagia    Procedure Details    Urine pregnancy test was done and was NEGATIVE .  The risks (including infection, bleeding, pain, and uterine perforation) and benefits of the procedure were explained to the patient and verbal informed consent was obtained.        The patient was placed in the dorsal lithotomy position.  A speculum inserted in the vagina, and the cervix prepped with povidone iodine X 3.      A sharp tenaculum was applied to the anterior lip of the cervix for stabilization.  A Pipelle was advanced through the external os but would advance through the internal os. The cervix was prepped with betadine again. The small arnulfo dilator was advanced easily into the endometrial cavity then the next size. The pipelle then advanced easily; the uterus sounded to a depth of 8.5cm and sample the endometrium using sterile technique.  Sample was sent for pathologic examination.    Condition:  Stable    Complications:  None  Patient tolerated the procedure well without complications.    Plan:    The patient was advised to call for any fever or for prolonged or severe pain or bleeding.   Pt advised to call if she does not receive results of embx within 10 days  She prefers to hold on treatment until she sees urogyn.   Plan f/u 3 months or prn

## 2022-11-02 LAB
DX ICD CODE: NORMAL
DX ICD CODE: NORMAL
PATH REPORT.FINAL DX SPEC: NORMAL
PATH REPORT.GROSS SPEC: NORMAL
PATH REPORT.SITE OF ORIGIN SPEC: NORMAL
PATHOLOGIST NAME: NORMAL
PAYMENT PROCEDURE: NORMAL

## 2023-02-08 ENCOUNTER — OFFICE VISIT (OUTPATIENT)
Dept: OBSTETRICS AND GYNECOLOGY | Age: 43
End: 2023-02-08
Payer: COMMERCIAL

## 2023-02-08 VITALS
WEIGHT: 273 LBS | DIASTOLIC BLOOD PRESSURE: 74 MMHG | SYSTOLIC BLOOD PRESSURE: 122 MMHG | HEIGHT: 66 IN | BODY MASS INDEX: 43.87 KG/M2

## 2023-02-08 DIAGNOSIS — T19.2XXA RETAINED TAMPON, INITIAL ENCOUNTER: Primary | ICD-10-CM

## 2023-02-08 PROCEDURE — 99213 OFFICE O/P EST LOW 20 MIN: CPT | Performed by: NURSE PRACTITIONER

## 2023-02-08 NOTE — PROGRESS NOTES
"Subjective     Chief Complaint   Patient presents with   • Gynecologic Exam     Retrieve aprilon       Hector Gilbert is a 42 y.o.  whose LMP is Patient's last menstrual period was 2023.     Pt presents today for possible retained tampon x 1 day  She states she put a tampon in before bed last night and woke up and it wasn't there  She does not remember removing it      No Additional Complaints Reported    The following portions of the patient's history were reviewed and updated as appropriate:vital signs, allergies, current medications, past medical history, past social history, past surgical history and problem list      Review of Systems   Pertinent items are noted in HPI.     Objective      /74   Ht 167.6 cm (66\")   Wt 124 kg (273 lb)   LMP 2023   BMI 44.06 kg/m²     Physical Exam    General:   alert, no distress and moderately obese   Heart: Not performed today   Lungs: Not performed today.   Breast: Not performed today   Neck: na   Abdomen: {Not performed today   CVA: Not performed today   Pelvis: External genitalia: normal general appearance  Urinary system: urethral meatus normal  Vaginal: normal mucosa without prolapse or lesions, normal without tenderness, induration or masses, normal rugae, presence of blood and no tampon noted on exam   Cervix: normal appearance   Extremities: Not performed today   Neurologic: negative   Psychiatric: Normal affect, judgement, and mood       Lab Review   Labs: No data reviewed     Imaging   No data reviewed    Assessment & Plan     ASSESSMENT  1. Retained tampon, initial encounter        PLAN  1. No orders of the defined types were placed in this encounter.      2. Medications prescribed this encounter:      No orders of the defined types were placed in this encounter.      3. No retained tampon noted on exam. F/u with any vaginal odor, pelvic pain.       Unique Hinkle, APRN  2023           "

## 2023-02-09 ENCOUNTER — APPOINTMENT (OUTPATIENT)
Dept: WOMENS IMAGING | Facility: HOSPITAL | Age: 43
End: 2023-02-09
Payer: COMMERCIAL

## 2023-02-09 PROCEDURE — 77063 BREAST TOMOSYNTHESIS BI: CPT | Performed by: RADIOLOGY

## 2023-02-09 PROCEDURE — 77067 SCR MAMMO BI INCL CAD: CPT | Performed by: RADIOLOGY

## 2023-02-15 PROBLEM — R92.8 ABNORMAL MAMMOGRAM: Status: RESOLVED | Noted: 2019-12-12 | Resolved: 2023-02-15

## 2023-02-16 NOTE — PROGRESS NOTES
Chief complaint: heavy menses, fibroid     HPI  Hector Gilbert is a 42 y.o. female is scheduled for televisit for f/u of menorrhagia. You have chosen to receive care through a telephone visit. Do you consent to use a telephone visit for your medical care today? Yes    Pt notes she saw Dr. Perla and she is going to do incontinence surgery this spring. She does not feel pt needs a hysterectomy. Pt notes she would like to start lysteda for management of heavy menses at this time.         The following portions of the patient's history were reviewed and updated as appropriate: allergies, current medications, past family history, past medical history, past social history, past surgical history and problem list.    Review of Systems  Pertinent items are noted in HPI.    LMP 02/05/2023         Physical Exam  No exam, televist.      Diagnoses and all orders for this visit:    1. Menorrhagia with regular cycle (Primary)    2. Leiomyoma        Contacted [facility] pharmacist and no contraindication to lysteda noted with her current medications. Sent rx with instructions for pt. Reviewed risks and side effects of medication with pt. Plan f/u 6 months or prn. Pt notes she has an annual booked in fall and will f/u then.     6 min spent on the phone with pt reviewing recent medical history, current medications and providing counseling

## 2023-02-17 ENCOUNTER — OFFICE VISIT (OUTPATIENT)
Dept: OBSTETRICS AND GYNECOLOGY | Age: 43
End: 2023-02-17
Payer: COMMERCIAL

## 2023-02-17 DIAGNOSIS — D21.9 LEIOMYOMA: ICD-10-CM

## 2023-02-17 DIAGNOSIS — N92.0 MENORRHAGIA WITH REGULAR CYCLE: Primary | ICD-10-CM

## 2023-02-17 PROCEDURE — 99441 PR PHYS/QHP TELEPHONE EVALUATION 5-10 MIN: CPT | Performed by: OBSTETRICS & GYNECOLOGY

## 2023-02-17 RX ORDER — TRANEXAMIC ACID 650 MG/1
1300 TABLET ORAL 3 TIMES DAILY PRN
Qty: 30 TABLET | Refills: 6 | Status: SHIPPED | OUTPATIENT
Start: 2023-02-17

## 2023-09-25 ENCOUNTER — OFFICE VISIT (OUTPATIENT)
Dept: OBSTETRICS AND GYNECOLOGY | Age: 43
End: 2023-09-25

## 2023-09-25 VITALS
SYSTOLIC BLOOD PRESSURE: 120 MMHG | BODY MASS INDEX: 42.78 KG/M2 | DIASTOLIC BLOOD PRESSURE: 80 MMHG | HEIGHT: 66 IN | WEIGHT: 266.2 LBS

## 2023-09-25 DIAGNOSIS — Z13.0 SCREENING FOR IRON DEFICIENCY ANEMIA: ICD-10-CM

## 2023-09-25 DIAGNOSIS — Z01.419 ENCOUNTER FOR GYNECOLOGICAL EXAMINATION: Primary | ICD-10-CM

## 2023-09-25 DIAGNOSIS — D21.9 LEIOMYOMA: ICD-10-CM

## 2023-09-25 DIAGNOSIS — Z91.89 AT HIGH RISK FOR BREAST CANCER: ICD-10-CM

## 2023-09-25 DIAGNOSIS — Z11.51 ENCOUNTER FOR SCREENING FOR HUMAN PAPILLOMAVIRUS (HPV): ICD-10-CM

## 2023-09-25 DIAGNOSIS — Z12.31 ENCOUNTER FOR SCREENING MAMMOGRAM FOR MALIGNANT NEOPLASM OF BREAST: ICD-10-CM

## 2023-09-25 RX ORDER — DRONABINOL 5 MG/1
5 CAPSULE ORAL
COMMUNITY

## 2023-09-25 RX ORDER — GABAPENTIN 100 MG/1
100 CAPSULE ORAL
COMMUNITY

## 2023-09-25 NOTE — PROGRESS NOTES
.Subjective     Chief Complaint   Patient presents with    Gynecologic Exam     Annual exam, Last Pap 2022 NEG, HPV NEG, Last Mammogram 2023, Last Colonoscopy 2021, Pt has no complaints today, doing well        History of Present Illness    Hector Gilbert is a 43 y.o.  who presents for annual exam.  Her menses are regular every 28-30 days, lasting 4-7 days, dysmenorrhea moderate. Her flow is heavy.  She decided not to take the lysteda as her GI did not recommend this with her other medication    Obstetric History:  OB History          0    Para   0    Term   0       0    AB   0    Living   0         SAB   0    IAB   0    Ectopic   0    Molar        Multiple   0    Live Births                   Menstrual History:     Patient's last menstrual period was 2023 (exact date).         Current contraception: abstinence  History of abnormal Pap smear: no  Received Gardasil immunization: no  Perform regular self breast exam:  yes  Family history of uterine or ovarian cancer: no  Family History of colon cancer: yes - pat cousin , mother with polyps  Family history of breast cancer: pat great aunt after menopause    Mammogram: up to date, neg 2023  Colonoscopy: done 2021, repeat 5 yrs per surgeon due to family hx polyps  DEXA: not indicated.    Exercise: moderately active  Calcium/Vitamin D: adequate intake    The following portions of the patient's history were reviewed and updated as appropriate: allergies, current medications, past family history, past medical history, past social history, past surgical history, and problem list.    Review of Systems    Review of Systems   Constitutional: Negative for fatigue.   Respiratory: Negative for shortness of breath.    Gastrointestinal: Negative for abdominal pain.   Genitourinary: Positive for urinary incontinence; Positive for heavy menses with moderate cramping; Negative for irregular bleeding  Neurological: Negative for  "headaches.   Psychiatric/Behavioral: Negative for dysphoric mood.         Objective   Physical Exam    /80   Ht 167.6 cm (66\")   Wt 121 kg (266 lb 3.2 oz)   LMP 09/03/2023 (Exact Date)   BMI 42.97 kg/m²   General:   Alert, in no distress   Heart: regular rate and rhythm   Lungs: clear to auscultation bilaterally   Breast: Inspection with healed scar left areola from prior excision; symmetric, dense, prominent glandular tissue noted in bilateral upper outer quadrants. No masses, retractions, nipple discharge or axillary adenopathy noted in either breast   Neck: Supple, no thyromegaly   Abdomen: Soft, no tenderness or guarding   Pelvis: External genitalia: normal general appearance  Urinary system: urethral meatus normal  Vaginal: normal mucosa without prolapse or lesions  Cervix: normal appearance  Adnexa: irregular prominence, suspect fibroid, low right adnexa  Uterus: slightly enlarged and irregular c/w hx fibroids   Extremities: Normal without edema   Neurologic: Alert and oriented   Psychiatric: Normal affect, judgment and mood     Assessment & Plan   Diagnoses and all orders for this visit:    1. Encounter for gynecological examination (Primary)  -     IGP, Apt HPV,rfx 16 / 18,45    2. Encounter for screening for human papillomavirus (HPV)  -     IGP, Apt HPV,rfx 16 / 18,45    3. Encounter for screening mammogram for malignant neoplasm of breast  -     Mammo Screening Digital Tomosynthesis Bilateral With CAD; Future    4. At high risk for breast cancer  -     MRI Breast Bilateral With & Without Contrast  -     Ambulatory Referral to High Risk Breast ([facility])    5. Leiomyoma    6. Screening for iron deficiency anemia  -     CBC & Differential  -     Ferritin        All questions answered.  Breast self exam technique reviewed and patient encouraged to perform self-exam monthly.  Discussed healthy lifestyle modifications.  Recommended 30 minutes of aerobic exercise five times per week.  Advised pt to call if " she does not receive results of pap within 2 weeks    Recommend high risk breast surveillance and pt agrees. Advised her to call if she does not receive contact to schedule MRI and breast clinic appt    Recommend she book f/u pelvic u/s for surveillance of fibroids. Discussed menstrual flow/cramping. Her GI recommended against lysteda. She considered endometrial ablation but had preferred to avoid LSC tubal that would be needed. She declines hormonal therapy. She will consider and observe for now. Will check labs to confirm anemia not worsening

## 2023-09-26 LAB
BASOPHILS # BLD AUTO: 0.04 10*3/MM3 (ref 0–0.2)
BASOPHILS NFR BLD AUTO: 0.6 % (ref 0–1.5)
EOSINOPHIL # BLD AUTO: 0.14 10*3/MM3 (ref 0–0.4)
EOSINOPHIL NFR BLD AUTO: 2 % (ref 0.3–6.2)
ERYTHROCYTE [DISTWIDTH] IN BLOOD BY AUTOMATED COUNT: 15.5 % (ref 12.3–15.4)
FERRITIN SERPL-MCNC: 16 NG/ML (ref 13–150)
HCT VFR BLD AUTO: 33.4 % (ref 34–46.6)
HGB BLD-MCNC: 10.7 G/DL (ref 12–15.9)
IMM GRANULOCYTES # BLD AUTO: 0.02 10*3/MM3 (ref 0–0.05)
IMM GRANULOCYTES NFR BLD AUTO: 0.3 % (ref 0–0.5)
LYMPHOCYTES # BLD AUTO: 1.47 10*3/MM3 (ref 0.7–3.1)
LYMPHOCYTES NFR BLD AUTO: 20.5 % (ref 19.6–45.3)
MCH RBC QN AUTO: 25.2 PG (ref 26.6–33)
MCHC RBC AUTO-ENTMCNC: 32 G/DL (ref 31.5–35.7)
MCV RBC AUTO: 78.6 FL (ref 79–97)
MONOCYTES # BLD AUTO: 0.55 10*3/MM3 (ref 0.1–0.9)
MONOCYTES NFR BLD AUTO: 7.7 % (ref 5–12)
NEUTROPHILS # BLD AUTO: 4.94 10*3/MM3 (ref 1.7–7)
NEUTROPHILS NFR BLD AUTO: 68.9 % (ref 42.7–76)
NRBC BLD AUTO-RTO: 0 /100 WBC (ref 0–0.2)
PLATELET # BLD AUTO: 363 10*3/MM3 (ref 140–450)
RBC # BLD AUTO: 4.25 10*6/MM3 (ref 3.77–5.28)
WBC # BLD AUTO: 7.16 10*3/MM3 (ref 3.4–10.8)

## 2023-10-03 ENCOUNTER — OFFICE VISIT (OUTPATIENT)
Dept: OBSTETRICS AND GYNECOLOGY | Age: 43
End: 2023-10-03
Payer: COMMERCIAL

## 2023-10-03 VITALS
SYSTOLIC BLOOD PRESSURE: 118 MMHG | DIASTOLIC BLOOD PRESSURE: 84 MMHG | WEIGHT: 266 LBS | BODY MASS INDEX: 42.75 KG/M2 | HEIGHT: 66 IN

## 2023-10-03 DIAGNOSIS — E28.2 PCOS (POLYCYSTIC OVARIAN SYNDROME): ICD-10-CM

## 2023-10-03 DIAGNOSIS — D50.0 IRON DEFICIENCY ANEMIA DUE TO CHRONIC BLOOD LOSS: ICD-10-CM

## 2023-10-03 DIAGNOSIS — D21.9 LEIOMYOMA: Primary | ICD-10-CM

## 2023-10-03 DIAGNOSIS — N92.0 MENORRHAGIA WITH REGULAR CYCLE: ICD-10-CM

## 2023-10-03 RX ORDER — AZITHROMYCIN 250 MG/1
TABLET, FILM COATED ORAL
COMMUNITY
Start: 2023-09-25 | End: 2023-10-03

## 2023-10-03 RX ORDER — DEXTROMETHORPHAN HYDROBROMIDE AND PROMETHAZINE HYDROCHLORIDE 15; 6.25 MG/5ML; MG/5ML
5-10 SYRUP ORAL
COMMUNITY
Start: 2023-09-25 | End: 2023-10-03

## 2023-10-03 RX ORDER — DIAPER,BRIEF,INFANT-TODD,DISP
1 EACH MISCELLANEOUS 2 TIMES DAILY
COMMUNITY
Start: 2023-06-29 | End: 2023-10-03

## 2023-10-03 NOTE — PROGRESS NOTES
"Chief Complaint   Patient presents with    Follow-up     Gyn F/u & US - hx of fibroids, discuss US results        HPI  Hector Gilbert is a 43 y.o. female is scheduled for u/s surveillance for fibroids. She notes she increased her dose of iron to twice daily after recent labs.         The following portions of the patient's history were reviewed and updated as appropriate: allergies, current medications, past family history, past medical history, past social history, past surgical history, and problem list.    Review of Systems  Pertinent items are noted in HPI.    /84   Ht 167.6 cm (66\")   Wt 121 kg (266 lb)   LMP 09/03/2023 (Exact Date)   BMI 42.93 kg/m²         Physical Exam  Constitutional:       Appearance: Normal appearance.   Pulmonary:      Effort: Pulmonary effort is normal.   Neurological:      General: No focal deficit present.      Mental Status: She is alert and oriented to person, place, and time.   Psychiatric:         Mood and Affect: Mood normal.         Behavior: Behavior normal.     Tv u/s: uterus with 3 fibroids, 2 larger subserosal vs pedunculated measuring 3.0 and 2.4 cm. Smaller fibroid measures 1.0 cm. Endometrium 11 mm. Ovaries with multiple small follicles.       Diagnoses and all orders for this visit:    1. Leiomyoma (Primary)    2. Menorrhagia with regular cycle    3. Iron deficiency anemia due to chronic blood loss    4. PCOS (polycystic ovarian syndrome)      Reviewed u/s with pt. Uterus is slightly larger with small growth of larger fibroids. Discussed this may be contributing to bleeding. Also discussed u/s evidence of PCOS still noted with prior biopsies supporting this. Reviewed that both fibroids and PCOS may be contributing. Reviewed options for management to include progestin pills/IUD, endometrial ablation with tubal, uterine artery embolization and hysterectomy. Pt's GI advised against lysteda. Pt has preferred to avoid hormones due to breast cancer risk. She also " prefers to avoid surgical management at this time. She will continue to consider and information on progestin IUD's provided. She also wants to discuss hormone treatment with Dr. Woodward. Plan f/u in February after her mammogram to review results and repeat CBC. Advised pt to call if she desires to schedule surgery or proceed with medical management prior to her next visit.

## 2023-10-23 ENCOUNTER — OFFICE VISIT (OUTPATIENT)
Dept: MAMMOGRAPHY | Facility: CLINIC | Age: 43
End: 2023-10-23
Payer: COMMERCIAL

## 2023-10-23 VITALS
DIASTOLIC BLOOD PRESSURE: 91 MMHG | BODY MASS INDEX: 41.78 KG/M2 | WEIGHT: 260 LBS | HEIGHT: 66 IN | OXYGEN SATURATION: 99 % | HEART RATE: 80 BPM | SYSTOLIC BLOOD PRESSURE: 138 MMHG

## 2023-10-23 DIAGNOSIS — Z91.89 AT HIGH RISK FOR BREAST CANCER: Primary | ICD-10-CM

## 2023-10-23 PROCEDURE — 99204 OFFICE O/P NEW MOD 45 MIN: CPT | Performed by: SURGERY

## 2023-10-23 NOTE — PROGRESS NOTES
Chief Complaint: Hector Gilbert is a 43 y.o.. female here today for Consult        History of Present Illness:  Patient presents with management of breast cancer risk.   She is a very nice 43-year-old black female who has been identified as being at high risk for developing breast cancer.  This is mainly based on her history of breast biopsies and breast density.  In 2020 she had some imaging that suggested some architectural distortion.  This area was ultimately biopsied and revealed a radial scar.  She did go back for an excisional biopsy which did not show an upgrade to malignancy.    Her family history is negative for breast or ovarian cancer.    She does have uterine fibroids with some associated bleeding.  Options for this have included progestin pills/IUD, endometrial ablation, uterine artery embolization, and hysterectomy.    Her imaging appears to be up-to-date.  Mammograms were performed in February 2023.  I personally reviewed those imaging studies.  She does have heterogeneously dense breast tissue.  I did not see any suspicious masses, areas of architectural distortion, or worrisome microcalcifications.  An MRI has been ordered but not performed yet.    Review of Systems:  Review of Systems   Skin:         The patient denies any noticeable changes to the skin of the breast   All other systems reviewed and are negative.     I have reviewed the ROS as documented by the MA/LPN/RN Michael Woodward MD      Past Medical and Surgical History:  Breast Biopsy History:  Patient has had the following breast biopsies:1  Breast Cancer HIstory:  Patient does not have a past medical history of breast cancer.  Breast Operations, and year:  0  Social History     Tobacco Use   Smoking Status Never    Passive exposure: Never   Smokeless Tobacco Never     Obstetric History:  Patient is premenopausal, first day of last period: [id number]  Number of pregnancies:0  Number of live births: 0  Number of abortions or  miscarriages: 0  Age of delivery of first child: 0  Patient did not breast feed.  Length of time taking birth control pills:6 mons  Patient has never taken hormone replacement    Past Surgical History:   Procedure Laterality Date    CARPAL TUNNEL RELEASE Right 04/2021    CARPAL TUNNEL RELEASE Left 03/2021    SPINAL FIXATION SURGERY      SPINE SURGERY         Past Medical History:   Diagnosis Date    Arthritis     Gastroparesis 02/2021    Polycystic ovary syndrome        Prior Hospitalizations, other than for surgery or childbirth, and year:  None    Social History:  Patient is single.  Patient has no children.    Family History:  Family History   Problem Relation Age of Onset    No Known Problems Father     Hypertension Mother     Coronary artery disease Maternal Grandmother     Diabetes Maternal Grandmother     Multiple myeloma Maternal Aunt     No Known Problems Maternal Uncle     No Known Problems Paternal Aunt     No Known Problems Paternal Uncle     Colon cancer Paternal Cousin 42    Ovarian cancer Neg Hx     Uterine cancer Neg Hx     Melanoma Neg Hx     Prostate cancer Neg Hx     Breast cancer Neg Hx        Vital Signs:  Vitals:    10/23/23 1440   BP: 138/91   Pulse: 80   SpO2: 99%       Medications:    Current Outpatient Prescriptions:     Current Outpatient Medications:     dronabinol (MARINOL) 5 MG capsule, Take 1 capsule by mouth 2 (Two) Times a Day Before Meals., Disp: , Rfl:     esomeprazole (NexIUM) 40 MG capsule, TK ONE C PO QD, Disp: , Rfl: 3    ferrous gluconate (FERGON) 324 MG tablet, , Disp: , Rfl:     gabapentin (NEURONTIN) 100 MG capsule, Take 1 capsule by mouth 6 (Six) Times a Day., Disp: , Rfl:     ibuprofen (ADVIL,MOTRIN) 600 MG tablet, TK 1 T PO  Q 8 H PRN P, Disp: , Rfl:     levocetirizine (XYZAL) 5 MG tablet, TK 1 T PO D IN THE CARRIE, Disp: , Rfl: 2    linaCLOtide (LINZESS PO),  Oral, Daily, 0 Refill(s), Disp: , Rfl:     losartan (COZAAR) 50 MG tablet, Take 1 tablet by mouth Daily., Disp: ,  Rfl:     ondansetron ODT (ZOFRAN-ODT) 4 MG disintegrating tablet, Place 1 tablet on the tongue Every 8 (Eight) Hours As Needed for Nausea or Vomiting., Disp: 15 tablet, Rfl: 0    rosuvastatin (CRESTOR) 10 MG tablet, Take  by mouth., Disp: , Rfl:     Physical Examination:  General Appearance:   Patient is in no distress.  She is well kept and has a BMI of 42  Psychiatric:  Patient with appropriate mood and affect. Alert and oriented to self, time, and place.    Breast, RIGHT:  large sized, symmetric with the contralateral side.  Breast skin is without erythema, edema, rashes.  There are no visible abnormalities upon inspection during the arm-raising maneuver or with hands on hips in the sitting position. There is no nipple retraction, discharge or nipple/areolar skin changes.There are no masses palpable in the sitting or supine positions.    Breast, LEFT:  large sized, symmetric with the contralateral side.  Breast skin is without erythema, edema, rashes.  There is a well-healed scar along the superior edge of the areola.  There are no visible abnormalities upon inspection during the arm-raising maneuver or with hands on hips in the sitting position. There is no nipple retraction, discharge or nipple/areolar skin changes.There are no masses palpable in the sitting or supine positions.    Lymphatic:  There is no axillary, cervical, infraclavicular, or supraclavicular adenopathy bilaterally.    Gastrointestinal:  Abdomen is soft, nondistended, and nontender.  There was no obvious hepatosplenomegaly or abdominal mass.  There are no scars from previous surgery.    Musculoskeletal:  Good strength in all 4 extremities.   There is good range of motion in both shoulders.        Assessment:  1. At high risk for breast cancer    The patient is aware that high risk breast cancer patients are typically identified because of a strong family history for breast cancer, a genetic mutation, LCIS, atypical hyperplasia, or history of  radiation treatment to the chest under the age of 30.  Our risk assessment models of estimated her lifetime risk of developing breast cancer between 31 and 39%.  Her 5-year risk is estimated at 0.8%.  Based on these findings, the patient would benefit from adding supplemental MRI in addition to yearly 3D mammography.  She would also benefit from twice yearly clinical breast examination which I would alternate with Dr. Walsh.  The patient would like to pursue this.    We also discussed taking birth control pills and using the IUD.  The progestin pills alone would not appear to affect her breast cancer risk.  I am not certain if the IUD would have any hormone within it but all in all, I think the benefit would outweigh any small risk for her.  She has no family history for breast cancer and no atypia on her biopsies.  She is still concerned about this and will continue to think about things and get back with Dr. Walsh.      Plan:  1.  I will follow-up on her MRI and call her with those results.  If everything looks good we will get another MRI next year.  2.  We will alternate 6-month visits with Dr. Walsh.  To get on track for that, I will see her back in the office in May 2024.  3.  Her next mammograms are due in February 2024.      CPT coding:    Next Appointment:  No follow-ups on file.            EMR Dragon/transcription disclaimer:    Much of this encounter note is an electronic transcription/translocation of spoken language to printed text.  The electronic translation of spoken language may permit erroneous, or at times, nonsensical words or phrases to be inadvertently transcribed.  Although I have reviewed the note from such areas, some may still exist.

## 2023-10-23 NOTE — LETTER
October 23, 2023     Candice Walsh MD  [address]  [address]  [facility] [address]    Patient: Hector Gilbert   YOB: 1980   Date of Visit: 10/23/2023     Dear Candice Walsh MD:       Thank you for referring Hector Gilbert to me for evaluation. Below are the relevant portions of my assessment and plan of care.    Assessment:  1. At high risk for breast cancer    The patient is aware that high risk breast cancer patients are typically identified because of a strong family history for breast cancer, a genetic mutation, LCIS, atypical hyperplasia, or history of radiation treatment to the chest under the age of 30.  Our risk assessment models of estimated her lifetime risk of developing breast cancer between 31 and 39%.  Her 5-year risk is estimated at 0.8%.  Based on these findings, the patient would benefit from adding supplemental MRI in addition to yearly 3D mammography.  She would also benefit from twice yearly clinical breast examination which I would alternate with Dr. Walsh.  The patient would like to pursue this.    We also discussed taking birth control pills and using the IUD.  The progestin pills alone would not appear to affect her breast cancer risk.  I am not certain if the IUD would have any hormone within it but all in all, I think the benefit would outweigh any small risk for her.  She has no family history for breast cancer and no atypia on her biopsies.  She is still concerned about this and will continue to think about things and get back with Dr. Walsh.      Plan:  1.  I will follow-up on her MRI and call her with those results.  If everything looks good we will get another MRI next year.  2.  We will alternate 6-month visits with Dr. Walsh.  To get on track for that, I will see her back in the office in May 2024.  3.  Her next mammograms are due in February 2024.  If you have questions, please do not hesitate to call me. I look forward to following  Hector along with you.         Sincerely,        Michael Woodward MD        CC: MD Crissy Infante, Michael ALDANA MD  10/23/23 1620  Sign when Signing Visit  Chief Complaint: Hector Gilbert is a 43 y.o.. female here today for Consult        History of Present Illness:  Patient presents with management of breast cancer risk.   She is a very nice 43-year-old black female who has been identified as being at high risk for developing breast cancer.  This is mainly based on her history of breast biopsies and breast density.  In 2020 she had some imaging that suggested some architectural distortion.  This area was ultimately biopsied and revealed a radial scar.  She did go back for an excisional biopsy which did not show an upgrade to malignancy.    Her family history is negative for breast or ovarian cancer.    She does have uterine fibroids with some associated bleeding.  Options for this have included progestin pills/IUD, endometrial ablation, uterine artery embolization, and hysterectomy.    Her imaging appears to be up-to-date.  Mammograms were performed in February 2023.  I personally reviewed those imaging studies.  She does have heterogeneously dense breast tissue.  I did not see any suspicious masses, areas of architectural distortion, or worrisome microcalcifications.  An MRI has been ordered but not performed yet.    Review of Systems:  Review of Systems   Skin:         The patient denies any noticeable changes to the skin of the breast   All other systems reviewed and are negative.     I have reviewed the ROS as documented by the MA/LPN/RN Michael Woodward MD      Past Medical and Surgical History:  Breast Biopsy History:  Patient has had the following breast biopsies:1  Breast Cancer HIstory:  Patient does not have a past medical history of breast cancer.  Breast Operations, and year:  0  Social History     Tobacco Use   Smoking Status Never   • Passive exposure: Never   Smokeless  Tobacco Never     Obstetric History:  Patient is premenopausal, first day of last period: [id number]  Number of pregnancies:0  Number of live births: 0  Number of abortions or miscarriages: 0  Age of delivery of first child: 0  Patient did not breast feed.  Length of time taking birth control pills:6 mons  Patient has never taken hormone replacement    Past Surgical History:   Procedure Laterality Date   • CARPAL TUNNEL RELEASE Right 04/2021   • CARPAL TUNNEL RELEASE Left 03/2021   • SPINAL FIXATION SURGERY     • SPINE SURGERY         Past Medical History:   Diagnosis Date   • Arthritis    • Gastroparesis 02/2021   • Polycystic ovary syndrome        Prior Hospitalizations, other than for surgery or childbirth, and year:  None    Social History:  Patient is single.  Patient has no children.    Family History:  Family History   Problem Relation Age of Onset   • No Known Problems Father    • Hypertension Mother    • Coronary artery disease Maternal Grandmother    • Diabetes Maternal Grandmother    • Multiple myeloma Maternal Aunt    • No Known Problems Maternal Uncle    • No Known Problems Paternal Aunt    • No Known Problems Paternal Uncle    • Colon cancer Paternal Cousin 42   • Ovarian cancer Neg Hx    • Uterine cancer Neg Hx    • Melanoma Neg Hx    • Prostate cancer Neg Hx    • Breast cancer Neg Hx        Vital Signs:  Vitals:    10/23/23 1440   BP: 138/91   Pulse: 80   SpO2: 99%       Medications:    Current Outpatient Prescriptions:     Current Outpatient Medications:   •  dronabinol (MARINOL) 5 MG capsule, Take 1 capsule by mouth 2 (Two) Times a Day Before Meals., Disp: , Rfl:   •  esomeprazole (NexIUM) 40 MG capsule, TK ONE C PO QD, Disp: , Rfl: 3  •  ferrous gluconate (FERGON) 324 MG tablet, , Disp: , Rfl:   •  gabapentin (NEURONTIN) 100 MG capsule, Take 1 capsule by mouth 6 (Six) Times a Day., Disp: , Rfl:   •  ibuprofen (ADVIL,MOTRIN) 600 MG tablet, TK 1 T PO  Q 8 H PRN P, Disp: , Rfl:   •  levocetirizine  (XYZAL) 5 MG tablet, TK 1 T PO D IN THE CARRIE, Disp: , Rfl: 2  •  linaCLOtide (LINZESS PO),  Oral, Daily, 0 Refill(s), Disp: , Rfl:   •  losartan (COZAAR) 50 MG tablet, Take 1 tablet by mouth Daily., Disp: , Rfl:   •  ondansetron ODT (ZOFRAN-ODT) 4 MG disintegrating tablet, Place 1 tablet on the tongue Every 8 (Eight) Hours As Needed for Nausea or Vomiting., Disp: 15 tablet, Rfl: 0  •  rosuvastatin (CRESTOR) 10 MG tablet, Take  by mouth., Disp: , Rfl:     Physical Examination:  General Appearance:   Patient is in no distress.  She is well kept and has a BMI of 42  Psychiatric:  Patient with appropriate mood and affect. Alert and oriented to self, time, and place.    Breast, RIGHT:  large sized, symmetric with the contralateral side.  Breast skin is without erythema, edema, rashes.  There are no visible abnormalities upon inspection during the arm-raising maneuver or with hands on hips in the sitting position. There is no nipple retraction, discharge or nipple/areolar skin changes.There are no masses palpable in the sitting or supine positions.    Breast, LEFT:  large sized, symmetric with the contralateral side.  Breast skin is without erythema, edema, rashes.  There is a well-healed scar along the superior edge of the areola.  There are no visible abnormalities upon inspection during the arm-raising maneuver or with hands on hips in the sitting position. There is no nipple retraction, discharge or nipple/areolar skin changes.There are no masses palpable in the sitting or supine positions.    Lymphatic:  There is no axillary, cervical, infraclavicular, or supraclavicular adenopathy bilaterally.    Gastrointestinal:  Abdomen is soft, nondistended, and nontender.  There was no obvious hepatosplenomegaly or abdominal mass.  There are no scars from previous surgery.    Musculoskeletal:  Good strength in all 4 extremities.   There is good range of motion in both shoulders.        Assessment:  1. At high risk for breast  cancer    The patient is aware that high risk breast cancer patients are typically identified because of a strong family history for breast cancer, a genetic mutation, LCIS, atypical hyperplasia, or history of radiation treatment to the chest under the age of 30.  Our risk assessment models of estimated her lifetime risk of developing breast cancer between 31 and 39%.  Her 5-year risk is estimated at 0.8%.  Based on these findings, the patient would benefit from adding supplemental MRI in addition to yearly 3D mammography.  She would also benefit from twice yearly clinical breast examination which I would alternate with Dr. Walsh.  The patient would like to pursue this.    We also discussed taking birth control pills and using the IUD.  The progestin pills alone would not appear to affect her breast cancer risk.  I am not certain if the IUD would have any hormone within it but all in all, I think the benefit would outweigh any small risk for her.  She has no family history for breast cancer and no atypia on her biopsies.  She is still concerned about this and will continue to think about things and get back with Dr. Walsh.      Plan:  1.  I will follow-up on her MRI and call her with those results.  If everything looks good we will get another MRI next year.  2.  We will alternate 6-month visits with Dr. Walsh.  To get on track for that, I will see her back in the office in May 2024.  3.  Her next mammograms are due in February 2024.      CPT coding:    Next Appointment:  No follow-ups on file.            EMR Dragon/transcription disclaimer:    Much of this encounter note is an electronic transcription/translocation of spoken language to printed text.  The electronic translation of spoken language may permit erroneous, or at times, nonsensical words or phrases to be inadvertently transcribed.  Although I have reviewed the note from such areas, some may still exist.

## 2023-11-22 ENCOUNTER — HOSPITAL ENCOUNTER (OUTPATIENT)
Dept: MRI IMAGING | Facility: HOSPITAL | Age: 43
Discharge: HOME | End: 2023-11-22
Admitting: OBSTETRICS & GYNECOLOGY
Payer: COMMERCIAL

## 2023-11-22 PROCEDURE — 0 GADOBENATE DIMEGLUMINE 529 MG/ML SOLUTION: Performed by: OBSTETRICS & GYNECOLOGY

## 2023-11-22 PROCEDURE — A9577 INJ MULTIHANCE: HCPCS | Performed by: OBSTETRICS & GYNECOLOGY

## 2023-11-22 PROCEDURE — 77049 MRI BREAST C-+ W/CAD BI: CPT

## 2023-11-22 RX ADMIN — GADOBENATE DIMEGLUMINE 20 ML: 529 INJECTION, SOLUTION INTRAVENOUS at 08:06

## 2024-02-08 ENCOUNTER — OFFICE VISIT (OUTPATIENT)
Dept: INTERNAL MEDICINE | Facility: CLINIC | Age: 44
End: 2024-02-08
Payer: COMMERCIAL

## 2024-02-08 VITALS
SYSTOLIC BLOOD PRESSURE: 140 MMHG | DIASTOLIC BLOOD PRESSURE: 80 MMHG | TEMPERATURE: 97.1 F | BODY MASS INDEX: 43.23 KG/M2 | HEIGHT: 66 IN | WEIGHT: 269 LBS | HEART RATE: 84 BPM | OXYGEN SATURATION: 98 % | RESPIRATION RATE: 18 BRPM

## 2024-02-08 DIAGNOSIS — G47.33 OSA (OBSTRUCTIVE SLEEP APNEA): ICD-10-CM

## 2024-02-08 DIAGNOSIS — E55.9 VITAMIN D DEFICIENCY: ICD-10-CM

## 2024-02-08 DIAGNOSIS — D50.9 IRON DEFICIENCY ANEMIA, UNSPECIFIED IRON DEFICIENCY ANEMIA TYPE: ICD-10-CM

## 2024-02-08 DIAGNOSIS — I10 PRIMARY HYPERTENSION: ICD-10-CM

## 2024-02-08 DIAGNOSIS — R73.03 PREDIABETES: ICD-10-CM

## 2024-02-08 DIAGNOSIS — K31.84 GASTROPARESIS: Primary | ICD-10-CM

## 2024-02-08 PROBLEM — T81.9XXA COMPLICATION OF SURGICAL PROCEDURE: Status: ACTIVE | Noted: 2022-06-15

## 2024-02-08 PROBLEM — T81.9XXA COMPLICATION OF SURGICAL PROCEDURE: Status: RESOLVED | Noted: 2022-06-15 | Resolved: 2024-02-08

## 2024-02-08 PROBLEM — K59.00 CONSTIPATION: Status: ACTIVE | Noted: 2020-09-16

## 2024-02-08 PROBLEM — M54.9 BACK PAIN: Status: ACTIVE | Noted: 2017-11-20

## 2024-02-08 PROBLEM — M54.32 SCIATICA OF LEFT SIDE: Status: ACTIVE | Noted: 2018-02-15

## 2024-02-08 PROBLEM — M70.61 TROCHANTERIC BURSITIS OF RIGHT HIP: Status: ACTIVE | Noted: 2022-06-15

## 2024-02-08 PROBLEM — M47.817 LUMBOSACRAL SPONDYLOSIS WITHOUT MYELOPATHY: Status: ACTIVE | Noted: 2022-06-15

## 2024-02-08 PROBLEM — G89.4 CHRONIC PAIN DISORDER: Status: ACTIVE | Noted: 2018-07-09

## 2024-02-08 PROBLEM — D44.9: Status: ACTIVE | Noted: 2018-07-20

## 2024-02-08 PROBLEM — J30.2 SEASONAL ALLERGIC RHINITIS: Status: ACTIVE | Noted: 2020-02-04

## 2024-02-08 PROBLEM — M51.36 DEGENERATION OF LUMBAR INTERVERTEBRAL DISC: Status: ACTIVE | Noted: 2022-06-15

## 2024-02-08 PROBLEM — D43.9: Status: ACTIVE | Noted: 2018-07-20

## 2024-02-08 PROBLEM — R11.2 NAUSEA AND VOMITING: Status: ACTIVE | Noted: 2022-07-07

## 2024-02-08 PROBLEM — M51.369 DEGENERATION OF LUMBAR INTERVERTEBRAL DISC: Status: ACTIVE | Noted: 2022-06-15

## 2024-02-08 PROBLEM — Z98.890 OTHER SPECIFIED POSTPROCEDURAL STATES: Status: ACTIVE | Noted: 2018-08-07

## 2024-02-08 LAB
25(OH)D3+25(OH)D2 SERPL-MCNC: 65.6 NG/ML (ref 30–100)
ALBUMIN SERPL-MCNC: 3.9 G/DL (ref 3.5–5.2)
ALBUMIN/GLOB SERPL: 1.2 G/DL
ALP SERPL-CCNC: 89 U/L (ref 39–117)
ALT SERPL-CCNC: 7 U/L (ref 1–33)
AST SERPL-CCNC: 11 U/L (ref 1–32)
BASOPHILS # BLD AUTO: 0.04 10*3/MM3 (ref 0–0.2)
BASOPHILS NFR BLD AUTO: 0.7 % (ref 0–1.5)
BILIRUB SERPL-MCNC: 0.3 MG/DL (ref 0–1.2)
BUN SERPL-MCNC: 13 MG/DL (ref 6–20)
BUN/CREAT SERPL: 14.1 (ref 7–25)
CALCIUM SERPL-MCNC: 9 MG/DL (ref 8.6–10.5)
CHLORIDE SERPL-SCNC: 105 MMOL/L (ref 98–107)
CO2 SERPL-SCNC: 26.3 MMOL/L (ref 22–29)
CREAT SERPL-MCNC: 0.92 MG/DL (ref 0.57–1)
EGFRCR SERPLBLD CKD-EPI 2021: 79.9 ML/MIN/1.73
EOSINOPHIL # BLD AUTO: 0.1 10*3/MM3 (ref 0–0.4)
EOSINOPHIL NFR BLD AUTO: 1.8 % (ref 0.3–6.2)
ERYTHROCYTE [DISTWIDTH] IN BLOOD BY AUTOMATED COUNT: 14.9 % (ref 12.3–15.4)
FERRITIN SERPL-MCNC: 22.7 NG/ML (ref 13–150)
FOLATE SERPL-MCNC: 7.32 NG/ML (ref 4.78–24.2)
GLOBULIN SER CALC-MCNC: 3.2 GM/DL
GLUCOSE SERPL-MCNC: 95 MG/DL (ref 65–99)
HBA1C MFR BLD: 5.9 % (ref 4.8–5.6)
HCT VFR BLD AUTO: 37.1 % (ref 34–46.6)
HGB BLD-MCNC: 12.1 G/DL (ref 12–15.9)
IMM GRANULOCYTES # BLD AUTO: 0.01 10*3/MM3 (ref 0–0.05)
IMM GRANULOCYTES NFR BLD AUTO: 0.2 % (ref 0–0.5)
LYMPHOCYTES # BLD AUTO: 1.53 10*3/MM3 (ref 0.7–3.1)
LYMPHOCYTES NFR BLD AUTO: 27.6 % (ref 19.6–45.3)
MCH RBC QN AUTO: 26.3 PG (ref 26.6–33)
MCHC RBC AUTO-ENTMCNC: 32.6 G/DL (ref 31.5–35.7)
MCV RBC AUTO: 80.7 FL (ref 79–97)
MONOCYTES # BLD AUTO: 0.39 10*3/MM3 (ref 0.1–0.9)
MONOCYTES NFR BLD AUTO: 7 % (ref 5–12)
NEUTROPHILS # BLD AUTO: 3.48 10*3/MM3 (ref 1.7–7)
NEUTROPHILS NFR BLD AUTO: 62.7 % (ref 42.7–76)
NRBC BLD AUTO-RTO: 0 /100 WBC (ref 0–0.2)
PLATELET # BLD AUTO: 365 10*3/MM3 (ref 140–450)
POTASSIUM SERPL-SCNC: 4.2 MMOL/L (ref 3.5–5.2)
PROT SERPL-MCNC: 7.1 G/DL (ref 6–8.5)
RBC # BLD AUTO: 4.6 10*6/MM3 (ref 3.77–5.28)
SODIUM SERPL-SCNC: 142 MMOL/L (ref 136–145)
TSH SERPL DL<=0.005 MIU/L-ACNC: 0.95 UIU/ML (ref 0.27–4.2)
VIT B12 SERPL-MCNC: 470 PG/ML (ref 211–946)
WBC # BLD AUTO: 5.55 10*3/MM3 (ref 3.4–10.8)

## 2024-02-08 NOTE — PROGRESS NOTES
Subjective   Hector Gilbert is a 43 y.o. female. Patient is here today for   Chief Complaint   Patient presents with    Establish Care    Hypertension    Prediabetes          Vitals:    02/08/24 0906   BP: 140/80   Pulse: 84   Resp: 18   Temp: 97.1 °F (36.2 °C)   SpO2: 98%     Body mass index is 43.42 kg/m².  The following portions of the patient's history were reviewed and updated as appropriate: allergies, current medications, past family history, past medical history, past social history, past surgical history and problem list.    Past Medical History:   Diagnosis Date    Arthritis     Gastroparesis 02/2021    Polycystic ovary syndrome       Allergies   Allergen Reactions    Amlodipine Other (See Comments)      Social History     Socioeconomic History    Marital status: Single    Number of children: 0   Tobacco Use    Smoking status: Never     Passive exposure: Never    Smokeless tobacco: Never   Vaping Use    Vaping Use: Never used   Substance and Sexual Activity    Alcohol use: Never    Drug use: No    Sexual activity: Not Currently     Partners: Male     Birth control/protection: Abstinence        Current Outpatient Medications:     dronabinol (MARINOL) 5 MG capsule, Take 1 capsule by mouth 2 (Two) Times a Day Before Meals., Disp: , Rfl:     esomeprazole (NexIUM) 40 MG capsule, TK ONE C PO QD, Disp: , Rfl: 3    ferrous gluconate (FERGON) 324 MG tablet, , Disp: , Rfl:     gabapentin (NEURONTIN) 100 MG capsule, Take 1 capsule by mouth 6 (Six) Times a Day., Disp: , Rfl:     ibuprofen (ADVIL,MOTRIN) 600 MG tablet, TK 1 T PO  Q 8 H PRN P, Disp: , Rfl:     levocetirizine (XYZAL) 5 MG tablet, TK 1 T PO D IN THE CARRIE, Disp: , Rfl: 2    linaclotide (LINZESS) 290 MCG capsule capsule, Take 1 capsule by mouth., Disp: , Rfl:     losartan (COZAAR) 50 MG tablet, Take 1 tablet by mouth Daily., Disp: , Rfl:     NON FORMULARY, Domperidone, Disp: , Rfl:     ondansetron ODT (ZOFRAN-ODT) 4 MG disintegrating tablet, Place 1  tablet on the tongue Every 8 (Eight) Hours As Needed for Nausea or Vomiting., Disp: 15 tablet, Rfl: 0    rosuvastatin (CRESTOR) 10 MG tablet, Take  by mouth., Disp: , Rfl:     vitamin D3 125 MCG (5000 UT) capsule capsule, , Disp: , Rfl:      Objective     Hypertension  Pertinent negatives include no chest pain or shortness of breath.     Hector Gilbert is a 43 year old female new patient who is here to establish care. She is transferring care from Criselda Almonte MD at [facility]. She has HTN, Prediabetes, gastroparesis, chronic pain, sleep apnea, GERD, PCOS , and iron deficiency anemia. She is followed by gyn and gastro. She stopped seeing pain management for low back pain . She was diagnosed with sleep apnea by Dr Pederson, an ENT last fall. She is not comfortable wearing a CPAP  The following data was reviewed by: MITCH Price on 02/08/2024:  Common labs          9/25/2023    15:11   Common Labs   WBC 7.16    Hemoglobin 10.7    Hematocrit 33.4    Platelets 363      Data reviewed : notes from previous PCP        Review of Systems   Constitutional:  Positive for fatigue.   HENT: Negative.     Respiratory:  Negative for shortness of breath.    Cardiovascular:  Negative for chest pain.   Gastrointestinal:         Gerd and gastroparesis    Genitourinary: Negative.    Musculoskeletal:  Positive for back pain.   Allergic/Immunologic: Positive for environmental allergies.       Physical Exam  Vitals and nursing note reviewed.   Constitutional:       General: She is not in acute distress.  Cardiovascular:      Rate and Rhythm: Normal rate and regular rhythm.      Heart sounds: Normal heart sounds.   Pulmonary:      Effort: Pulmonary effort is normal.      Breath sounds: Normal breath sounds.   Skin:     General: Skin is warm and dry.   Neurological:      Mental Status: She is alert and oriented to person, place, and time.         Assessment    ASSESSMENT    Problems Addressed this Visit       Gastroparesis -  Primary    HTN (hypertension)    Iron deficiency anemia    Relevant Orders    CBC & Differential    Comprehensive Metabolic Panel    TSH Rfx On Abnormal To Free T4    Ferritin    Vitamin B12    Folate    Vitamin D deficiency    Relevant Orders    Vitamin D,25-Hydroxy     Other Visit Diagnoses       Prediabetes        Relevant Orders    Hemoglobin A1c    DOMINGUEZ (obstructive sleep apnea)        Relevant Orders    Ambulatory Referral to Sleep Medicine          Diagnoses         Codes Comments    Gastroparesis    -  Primary ICD-10-CM: K31.84  ICD-9-CM: 536.3     Primary hypertension     ICD-10-CM: I10  ICD-9-CM: 401.9     Iron deficiency anemia, unspecified iron deficiency anemia type     ICD-10-CM: D50.9  ICD-9-CM: 280.9     Vitamin D deficiency     ICD-10-CM: E55.9  ICD-9-CM: 268.9     Prediabetes     ICD-10-CM: R73.03  ICD-9-CM: 790.29     DOMINGUEZ (obstructive sleep apnea)     ICD-10-CM: G47.33  ICD-9-CM: 327.23             PLAN    HTN is well controlled  Follow up with GI as scheduled  Continue iron and will recheck cbc and ferritin today.   I would like her to see Sleep medicine for a consult since she is hesitant to use the CPAP. Will get records and sleep study from Dr Pederson       Return in about 8 months (around 9/27/2024) for Annual physical, with labs.

## 2024-02-08 NOTE — PROGRESS NOTES
Subjective   Hector Gilbert is a 43 y.o. female. Patient is here today for   Chief Complaint   Patient presents with    Establish Care    Hypertension    Prediabetes          Vitals:    02/08/24 0906   BP: 140/80   Pulse: 84   Resp: 18   Temp: 97.1 °F (36.2 °C)   SpO2: 98%     Body mass index is 43.42 kg/m².    The following portions of the patient's history were reviewed and updated as appropriate: allergies, current medications, past family history, past medical history, past social history, past surgical history and problem list.    Past Medical History:   Diagnosis Date    Arthritis     Gastroparesis 02/2021    Polycystic ovary syndrome       Allergies   Allergen Reactions    Amlodipine Other (See Comments)      Social History     Socioeconomic History    Marital status: Single    Number of children: 0   Tobacco Use    Smoking status: Never     Passive exposure: Never    Smokeless tobacco: Never   Vaping Use    Vaping Use: Never used   Substance and Sexual Activity    Alcohol use: Never    Drug use: No    Sexual activity: Not Currently     Partners: Male     Birth control/protection: Abstinence        Current Outpatient Medications:     dronabinol (MARINOL) 5 MG capsule, Take 1 capsule by mouth 2 (Two) Times a Day Before Meals., Disp: , Rfl:     esomeprazole (NexIUM) 40 MG capsule, TK ONE C PO QD, Disp: , Rfl: 3    ferrous gluconate (FERGON) 324 MG tablet, , Disp: , Rfl:     gabapentin (NEURONTIN) 100 MG capsule, Take 1 capsule by mouth 6 (Six) Times a Day., Disp: , Rfl:     ibuprofen (ADVIL,MOTRIN) 600 MG tablet, TK 1 T PO  Q 8 H PRN P, Disp: , Rfl:     levocetirizine (XYZAL) 5 MG tablet, TK 1 T PO D IN THE CARRIE, Disp: , Rfl: 2    linaclotide (LINZESS) 290 MCG capsule capsule, Take 1 capsule by mouth., Disp: , Rfl:     losartan (COZAAR) 50 MG tablet, Take 1 tablet by mouth Daily., Disp: , Rfl:     NON FORMULARY, Domperidone, Disp: , Rfl:     ondansetron ODT (ZOFRAN-ODT) 4 MG disintegrating tablet, Place 1  tablet on the tongue Every 8 (Eight) Hours As Needed for Nausea or Vomiting., Disp: 15 tablet, Rfl: 0    rosuvastatin (CRESTOR) 10 MG tablet, Take  by mouth., Disp: , Rfl:     vitamin D3 125 MCG (5000 UT) capsule capsule, , Disp: , Rfl:      EKG Procedures    ECG Report    Objective   History of Present Illness   Hector Gilbert 43 y.o. female who presents for an Annual physical exam.   Labs results discussed in detail with the patient.  Plan to update vaccines if needed today.    Health Habits:  Dental Exam. {status:10648}  Eye Exam. {status:94508}  Exercise: {numbers; 0-10:55958} times/week.  Current exercise activities include: {misc; exercise types:29440}    Preventative counseling  Discussed face to face the importance of healthy diet and exercise.     Lab Results (most recent)       None              Review of Systems    Physical Exam    ASSESSMENT       Problems Addressed this Visit       Gastroparesis - Primary    HTN (hypertension)    Iron deficiency anemia    Relevant Orders    CBC & Differential    Comprehensive Metabolic Panel    TSH Rfx On Abnormal To Free T4    Ferritin    Vitamin B12    Folate    Vitamin D deficiency    Relevant Orders    Vitamin D,25-Hydroxy     Other Visit Diagnoses       Prediabetes        Relevant Orders    Hemoglobin A1c    DOMINGUEZ (obstructive sleep apnea)        Relevant Orders    Ambulatory Referral to Sleep Medicine          Diagnoses         Codes Comments    Gastroparesis    -  Primary ICD-10-CM: K31.84  ICD-9-CM: 536.3     Primary hypertension     ICD-10-CM: I10  ICD-9-CM: 401.9     Iron deficiency anemia, unspecified iron deficiency anemia type     ICD-10-CM: D50.9  ICD-9-CM: 280.9     Vitamin D deficiency     ICD-10-CM: E55.9  ICD-9-CM: 268.9     Prediabetes     ICD-10-CM: R73.03  ICD-9-CM: 790.29     DOMINGUEZ (obstructive sleep apnea)     ICD-10-CM: G47.33  ICD-9-CM: 327.23             PLAN    There are no Patient Instructions on file for this visit.    No follow-ups on  file.  Patient was given instructions and counseling regarding her  condition for health maintenance advice.  Please see specific information pulled into the AVS if appropriate.

## 2024-02-23 ENCOUNTER — HOSPITAL ENCOUNTER (OUTPATIENT)
Dept: MAMMOGRAPHY | Facility: HOSPITAL | Age: 44
Discharge: HOME | End: 2024-02-23
Admitting: OBSTETRICS & GYNECOLOGY
Payer: COMMERCIAL

## 2024-02-23 DIAGNOSIS — Z12.31 ENCOUNTER FOR SCREENING MAMMOGRAM FOR MALIGNANT NEOPLASM OF BREAST: ICD-10-CM

## 2024-02-23 PROCEDURE — 77067 SCR MAMMO BI INCL CAD: CPT

## 2024-02-23 PROCEDURE — 77063 BREAST TOMOSYNTHESIS BI: CPT

## 2024-02-26 ENCOUNTER — OFFICE VISIT (OUTPATIENT)
Dept: OBSTETRICS AND GYNECOLOGY | Age: 44
End: 2024-02-26
Payer: COMMERCIAL

## 2024-02-26 ENCOUNTER — TELEPHONE (OUTPATIENT)
Dept: INTERNAL MEDICINE | Facility: CLINIC | Age: 44
End: 2024-02-26
Payer: COMMERCIAL

## 2024-02-26 VITALS
WEIGHT: 263.8 LBS | SYSTOLIC BLOOD PRESSURE: 120 MMHG | BODY MASS INDEX: 42.4 KG/M2 | DIASTOLIC BLOOD PRESSURE: 76 MMHG | HEIGHT: 66 IN

## 2024-02-26 DIAGNOSIS — Z20.2 EXPOSURE TO STD: ICD-10-CM

## 2024-02-26 DIAGNOSIS — D21.9 LEIOMYOMA: Primary | ICD-10-CM

## 2024-02-26 DIAGNOSIS — Z91.89 AT HIGH RISK FOR BREAST CANCER: ICD-10-CM

## 2024-02-26 DIAGNOSIS — E28.2 PCOS (POLYCYSTIC OVARIAN SYNDROME): ICD-10-CM

## 2024-02-26 DIAGNOSIS — Z13.0 SCREENING FOR IRON DEFICIENCY ANEMIA: ICD-10-CM

## 2024-02-26 RX ORDER — FAMOTIDINE 20 MG/1
TABLET, FILM COATED ORAL NIGHTLY
COMMUNITY
Start: 2024-02-09

## 2024-02-26 RX ORDER — LOSARTAN POTASSIUM 50 MG/1
50 TABLET ORAL DAILY
Qty: 90 TABLET | Refills: 1 | Status: SHIPPED | OUTPATIENT
Start: 2024-02-26

## 2024-02-26 NOTE — TELEPHONE ENCOUNTER
----- Message from Hector Gilbert sent at 2/25/2024  6:29 AM EST -----  Regarding: Losartan Potassium 50 MG TAB  Contact: [phone]  Hello,    I need to refill a prescription for Losartan. My current bottle has no more refills remaining.     Thank you

## 2024-02-26 NOTE — PROGRESS NOTES
"Chief Complaint   Patient presents with    Follow-up     GYN F/U for Breast imaging results review, CBC today, Pt wanting STD screening today        HPI  Hector Gilbert is a 43 y.o. female presents for f/u. She notes her menses have been every 24 days for the last 8 months. Flow is  heavy lasting 7 to 10 days. She notes she discussed options with Dr. Woodward and he felt Mirena was reasonable. She still feels she would like to avoid any hormone exposures.     She requests STD screening noting she has unprotected intercourse twice this month.        The following portions of the patient's history were reviewed and updated as appropriate: allergies, current medications, past family history, past medical history, past social history, past surgical history, and problem list.    Review of Systems  Pertinent items are noted in HPI.    /76   Ht 167.6 cm (66\")   Wt 120 kg (263 lb 12.8 oz)   LMP 02/26/2024 (Exact Date)   BMI 42.58 kg/m²         Physical Exam  Constitutional:       Appearance: Normal appearance.   Pulmonary:      Effort: Pulmonary effort is normal.   Genitourinary:     Comments: Small blood in vaginal vault. No focal vaginal or cervical lesions.   Neurological:      General: No focal deficit present.      Mental Status: She is alert and oriented to person, place, and time.   Psychiatric:         Mood and Affect: Mood normal.         Behavior: Behavior normal.             Diagnoses and all orders for this visit:    1. Leiomyoma (Primary)    2. PCOS (polycystic ovarian syndrome)    3. Screening for iron deficiency anemia  -     Cancel: CBC & Differential  -     Cancel: Ferritin    4. Exposure to STD  -     HIV-1 / O / 2 Ag / Antibody  -     RPR  -     Hepatitis B Surface Antigen  -     Chlamydia trachomatis, Neisseria gonorrhoeae, Trichomonas vaginalis, PCR - Swab, Vagina  -     Hepatitis C Antibody  -     POC Pregnancy, Urine    5. At high risk for breast cancer    Discussed options for management " of symptoms. Pt prefers to avoid hormone exposure. Reviewed options of surgical management with endometrial ablation with tubal ligation or hysterectomy. Discussed new options of LSC treatment of fibroids which could provide some improvement but may not address if heavier flow related to PCOS.  Discussed option of uterine artery embolization if she wishes to avoid surgery. Pt has been offered lysteda and notes her GI MD advised against this. She has received ACOG information on fibroids. Discussed option of observation as recent CBC is normal.     STD screening ordered. Encouraged safe sex practices. Urine HCG is negative today.     Mammogram obtained 3 days ago and results are pending. Advised pt to call if she does not receive results within 2 weeks of testing.

## 2024-02-27 ENCOUNTER — TELEPHONE (OUTPATIENT)
Dept: OBSTETRICS AND GYNECOLOGY | Age: 44
End: 2024-02-27
Payer: COMMERCIAL

## 2024-02-27 DIAGNOSIS — R92.8 ABNORMAL FINDING ON BREAST IMAGING: ICD-10-CM

## 2024-02-27 DIAGNOSIS — Z12.31 SCREENING MAMMOGRAM FOR BREAST CANCER: Primary | ICD-10-CM

## 2024-02-27 DIAGNOSIS — R92.8 ABNORMAL FINDINGS ON DIAGNOSTIC IMAGING OF BREAST: Primary | ICD-10-CM

## 2024-02-27 PROBLEM — D50.9 IRON DEFICIENCY ANEMIA: Status: RESOLVED | Noted: 2018-08-02 | Resolved: 2024-02-27

## 2024-02-27 LAB
BASOPHILS # BLD AUTO: 0.1 X10E3/UL (ref 0–0.2)
BASOPHILS NFR BLD AUTO: 1 %
C TRACH RRNA SPEC QL NAA+PROBE: NEGATIVE
EOSINOPHIL # BLD AUTO: 0.1 X10E3/UL (ref 0–0.4)
EOSINOPHIL NFR BLD AUTO: 1 %
ERYTHROCYTE [DISTWIDTH] IN BLOOD BY AUTOMATED COUNT: 15.3 % (ref 11.7–15.4)
FERRITIN SERPL-MCNC: 49 NG/ML (ref 15–150)
HBV SURFACE AG SERPL QL IA: NEGATIVE
HCT VFR BLD AUTO: 38.8 % (ref 34–46.6)
HCV IGG SERPL QL IA: NON REACTIVE
HGB BLD-MCNC: 13.3 G/DL (ref 11.1–15.9)
HIV 1+2 AB+HIV1 P24 AG SERPL QL IA: NON REACTIVE
IMM GRANULOCYTES # BLD AUTO: 0 X10E3/UL (ref 0–0.1)
IMM GRANULOCYTES NFR BLD AUTO: 0 %
LYMPHOCYTES # BLD AUTO: 2.2 X10E3/UL (ref 0.7–3.1)
LYMPHOCYTES NFR BLD AUTO: 34 %
MCH RBC QN AUTO: 27.8 PG (ref 26.6–33)
MCHC RBC AUTO-ENTMCNC: 34.3 G/DL (ref 31.5–35.7)
MCV RBC AUTO: 81 FL (ref 79–97)
MONOCYTES # BLD AUTO: 0.5 X10E3/UL (ref 0.1–0.9)
MONOCYTES NFR BLD AUTO: 8 %
N GONORRHOEA RRNA SPEC QL NAA+PROBE: NEGATIVE
NEUTROPHILS # BLD AUTO: 3.5 X10E3/UL (ref 1.4–7)
NEUTROPHILS NFR BLD AUTO: 56 %
PLATELET # BLD AUTO: 411 X10E3/UL (ref 150–450)
RBC # BLD AUTO: 4.79 X10E6/UL (ref 3.77–5.28)
RPR SER QL: NON REACTIVE
T VAGINALIS RRNA SPEC QL NAA+PROBE: NEGATIVE
WBC # BLD AUTO: 6.3 X10E3/UL (ref 3.4–10.8)

## 2024-02-27 NOTE — TELEPHONE ENCOUNTER
Called Hector and reviewed her breast imaging results and advised f/u needed for right breast and ordered. She agrees to book. Advised her to call if she does not receive results within a few days of imaging.

## 2024-02-28 ENCOUNTER — TELEPHONE (OUTPATIENT)
Dept: OBSTETRICS AND GYNECOLOGY | Age: 44
End: 2024-02-28
Payer: COMMERCIAL

## 2024-02-28 NOTE — TELEPHONE ENCOUNTER
Caller: Hector Gilbert    Relationship: Self    Best call back number: [phone]    What orders are you requesting (i.e. lab or imaging): DIAGNOSTIC MAMMOGRAM     In what timeframe would the patient need to come in: ASAP    Where will you receive your lab/imaging services: [facility]

## 2024-03-04 ENCOUNTER — DOCUMENTATION (OUTPATIENT)
Dept: MAMMOGRAPHY | Facility: CLINIC | Age: 44
End: 2024-03-04
Payer: COMMERCIAL

## 2024-03-04 NOTE — PROGRESS NOTES
I spoke with her today.  The recent mammogram does show some faint microcalcifications located in the right breast within some dense breast tissue.  She already has magnification views ordered.  The patient is aware that they will likely want to either repeat the images in 6 months or perform a stereotactic biopsy.  I will call her when those results are back.

## 2024-03-13 ENCOUNTER — HOSPITAL ENCOUNTER (OUTPATIENT)
Dept: MAMMOGRAPHY | Facility: HOSPITAL | Age: 44
Discharge: HOME | End: 2024-03-13
Admitting: OBSTETRICS & GYNECOLOGY
Payer: COMMERCIAL

## 2024-03-13 ENCOUNTER — TELEPHONE (OUTPATIENT)
Dept: OBSTETRICS AND GYNECOLOGY | Age: 44
End: 2024-03-13
Payer: COMMERCIAL

## 2024-03-13 DIAGNOSIS — R92.8 ABNORMAL FINDING ON BREAST IMAGING: ICD-10-CM

## 2024-03-13 DIAGNOSIS — R92.8 ABNORMAL FINDING ON BREAST IMAGING: Primary | ICD-10-CM

## 2024-03-13 PROCEDURE — 77065 DX MAMMO INCL CAD UNI: CPT

## 2024-03-13 NOTE — TELEPHONE ENCOUNTER
Called Hector and reviewed her breast imaging results. Advised the radiologist recommends biopsy and this has been ordered. She agrees to book. Advised her to call me if she does not receive results of biopsy within 2 days of procedure.

## 2024-03-13 NOTE — PROGRESS NOTES
03/22/24 0001   Pre-Procedure Phone Call   Procedure Time Verified Yes   Arrival Time 1130   Procedure Location Verified Yes   Medical History Reviewed Yes   NPO Status Reinforced No   Ride and Caregiver Arranged N/A   Patient Knows to Bring Current Medications No   Bring Outside Films Requested No

## 2024-03-13 NOTE — TELEPHONE ENCOUNTER
Pt aware of appt 3/22 11:30 arrival, no blood thinners 1 week prior, and a nurse will call her prior to appt.

## 2024-03-14 NOTE — PROGRESS NOTES
03/19/24 0001   Pre-Procedure Phone Call   Procedure Time Verified Yes   Arrival Time 1130   Procedure Location Verified Yes   Medical History Reviewed Yes   NPO Status Reinforced No   Ride and Caregiver Arranged N/A   Patient Knows to Bring Current Medications No   Bring Outside Films Requested No

## 2024-03-19 ENCOUNTER — HOSPITAL ENCOUNTER (OUTPATIENT)
Dept: MAMMOGRAPHY | Facility: HOSPITAL | Age: 44
Discharge: HOME | End: 2024-03-19
Admitting: OBSTETRICS & GYNECOLOGY
Payer: COMMERCIAL

## 2024-03-19 VITALS
HEART RATE: 75 BPM | HEIGHT: 66 IN | OXYGEN SATURATION: 100 % | DIASTOLIC BLOOD PRESSURE: 80 MMHG | RESPIRATION RATE: 17 BRPM | BODY MASS INDEX: 42.27 KG/M2 | TEMPERATURE: 97.5 F | WEIGHT: 263 LBS | SYSTOLIC BLOOD PRESSURE: 107 MMHG

## 2024-03-19 DIAGNOSIS — R92.8 ABNORMAL FINDING ON BREAST IMAGING: ICD-10-CM

## 2024-03-19 PROCEDURE — 25010000002 LIDOCAINE 1 % SOLUTION: Performed by: OBSTETRICS & GYNECOLOGY

## 2024-03-19 PROCEDURE — 88305 TISSUE EXAM BY PATHOLOGIST: CPT | Performed by: OBSTETRICS & GYNECOLOGY

## 2024-03-19 PROCEDURE — A4648 IMPLANTABLE TISSUE MARKER: HCPCS

## 2024-03-19 RX ORDER — LIDOCAINE HYDROCHLORIDE 10 MG/ML
10 INJECTION, SOLUTION INFILTRATION; PERINEURAL ONCE
Status: COMPLETED | OUTPATIENT
Start: 2024-03-19 | End: 2024-03-19

## 2024-03-19 RX ADMIN — LIDOCAINE HYDROCHLORIDE 1 ML: 10 INJECTION, SOLUTION INFILTRATION; PERINEURAL at 12:57

## 2024-03-19 RX ADMIN — LIDOCAINE HYDROCHLORIDE 16 ML: 10; .005 INJECTION, SOLUTION EPIDURAL; INFILTRATION; INTRACAUDAL; PERINEURAL at 12:57

## 2024-03-19 NOTE — NURSING NOTE
Biopsy site to right outer breast clear with Dermabond dry and intact. No firmness or swelling noted at or around biopsy site. Denies pain. Ice pack with protective covering applied to biopsy site. Discharge instructions discussed with understanding voiced by patient. Copies provided to patient. No distress noted. To home via personal vehicle.

## 2024-03-20 ENCOUNTER — TELEPHONE (OUTPATIENT)
Dept: MAMMOGRAPHY | Facility: HOSPITAL | Age: 44
End: 2024-03-20
Payer: COMMERCIAL

## 2024-03-20 LAB
LAB AP CASE REPORT: NORMAL
LAB AP DIAGNOSIS COMMENT: NORMAL
PATH REPORT.FINAL DX SPEC: NORMAL
PATH REPORT.GROSS SPEC: NORMAL

## 2024-03-20 RX ORDER — DIAZEPAM 5 MG/1
5 TABLET ORAL ONCE AS NEEDED
Status: SHIPPED | OUTPATIENT
Start: 2024-03-20

## 2024-03-20 NOTE — TELEPHONE ENCOUNTER
Left message checking on patient after procedure yesterday. Requested call back with questions or concerns.

## 2024-03-22 ENCOUNTER — HOSPITAL ENCOUNTER (OUTPATIENT)
Dept: MAMMOGRAPHY | Facility: HOSPITAL | Age: 44
Discharge: HOME | End: 2024-03-22
Payer: COMMERCIAL

## 2024-03-25 ENCOUNTER — DOCUMENTATION (OUTPATIENT)
Dept: MAMMOGRAPHY | Facility: CLINIC | Age: 44
End: 2024-03-25
Payer: COMMERCIAL

## 2024-03-25 NOTE — PROGRESS NOTES
I spoke with her about the recent stereotactic biopsy.  The specimen did reveal calcifications.  The path report showed some fibrocystic change but no atypia or cancer.

## 2024-03-28 NOTE — PROGRESS NOTES
"Chief Complaint   Patient presents with    Follow-up     GYN F/U for Tubal consultation & Endometrial Biopsy, Pt has no complaints today         HPI  Hector Gilbert is a 43 y.o. female scheduled preop visit for endometrial ablation and tubal sterilization.         The following portions of the patient's history were reviewed and updated as appropriate: allergies, current medications, past family history, past medical history, past social history, past surgical history, and problem list.    Review of Systems  Pertinent items are noted in HPI.    /78   Ht 167.6 cm (66\")   Wt 117 kg (257 lb 12.8 oz)   LMP 03/22/2024 (Exact Date)   BMI 41.61 kg/m²         Physical Exam  Constitutional:       Appearance: Normal appearance.   Pulmonary:      Effort: Pulmonary effort is normal.   Genitourinary:     Comments: Normal vagina and cervix.   Neurological:      General: No focal deficit present.      Mental Status: She is alert and oriented to person, place, and time.   Psychiatric:         Mood and Affect: Mood normal.         Behavior: Behavior normal.             Diagnoses and all orders for this visit:    1. Menorrhagia with regular cycle (Primary)  -     Reference Histopathology    2. Request for sterilization    3. Encounter for biopsy  -     POC Pregnancy, Urine    Other orders  -     miSOPROStol (Cytotec) 200 MCG tablet; Take 1 tablet by mouth 1 (One) Time for 1 dose. The night prior to endometrial ablation  Dispense: 1 tablet; Refill: 0        Endometrial Biopsy Procedure Note    Pre-operative Diagnosis: menorrhagia    Post-operative Diagnosis: same    Indications: menorrhagia    Procedure Details    Urine pregnancy test was done and was NEGATIVE .  The risks (including infection, bleeding, pain, and uterine perforation) and benefits of the procedure were explained to the patient and  verbal and written  informed consent was obtained.        The patient was placed in the dorsal lithotomy position.  A " speculum inserted in the vagina, and the cervix prepped with povidone iodine X 3.      A sharp tenaculum was applied to the anterior lip of the cervix for stabilization.  A Pipelle was used to sound the uterus to a depth of 8.5cm and sample the endometrium using sterile technique.  Sample was sent for pathologic examination.    Condition:  Stable    Complications:  None  Patient tolerated the procedure well without complications.    Plan:    The patient was advised to call for any fever or for prolonged or severe pain or bleeding.      Risks of laparoscopic salpingectomy and Novasure endometrial ablation were reviewed to include bleeding, transfusion, infection, sepsis, damage to internal organs, need for additional surgeries due to complications, anesthetic complications, DVT/PE, abnormal uterine bleeding, chronic pelvic pain, need for hysterectomy and death. Patient understands that salpingectomy is permanent with 1/200 risk of failure and increased risk of ectopic following. Advised pt to avoid aspirin, motrin, aleve or any NSAIDs for 3 weeks prior to surgery. Advised her to abstain from intercourse over 4 weeks prior to surgery. Discussed that endometrial ablation will not be performed if device does not fit in cavity of if cavity assessment does not pass. Patient notes she does not want to proceed with tubal sterilization if she is unable to have endometrial ablation. Patient counseled that some patients will fail to have improved bleeding or may have irregular bleeding after ablation. Patient notes understanding and desires to schedule if embx is benign/negative for hyperplasia. Advised pt to sign tubal papers today. Recommend use of cytotec prior to ablation as internal os was slightly stenotic with initial pass of pipelle.

## 2024-03-29 ENCOUNTER — TELEPHONE (OUTPATIENT)
Dept: INTERNAL MEDICINE | Facility: CLINIC | Age: 44
End: 2024-03-29
Payer: COMMERCIAL

## 2024-03-29 ENCOUNTER — PATIENT MESSAGE (OUTPATIENT)
Dept: INTERNAL MEDICINE | Facility: CLINIC | Age: 44
End: 2024-03-29
Payer: COMMERCIAL

## 2024-03-29 RX ORDER — FERROUS GLUCONATE 324(38)MG
324 TABLET ORAL
Qty: 30 TABLET | Refills: 5 | Status: SHIPPED | OUTPATIENT
Start: 2024-03-29

## 2024-03-29 NOTE — TELEPHONE ENCOUNTER
----- Message from Robbie Garcia MA sent at 3/29/2024  7:57 AM EDT -----  Regarding: FW: FERROUS GLUCONATE   Contact: [phone]  Ok to refill?  ----- Message -----  From: Hector Gilbert  Sent: 3/29/2024   7:40 AM EDT  To: Sachin Ayala Formerly named [facility]  Subject: FERROUS GLUCONATE                                Hello,     I need to refill my prescription for Ferrous Gluconate.     Thanks

## 2024-03-29 NOTE — TELEPHONE ENCOUNTER
Please call Hector and let her know that I refilled her prescription for ferrous gluconate. There were no directions on it but it is typically prescribed as once a day

## 2024-04-01 ENCOUNTER — OFFICE VISIT (OUTPATIENT)
Dept: OBSTETRICS AND GYNECOLOGY | Age: 44
End: 2024-04-01
Payer: COMMERCIAL

## 2024-04-01 VITALS
HEIGHT: 66 IN | WEIGHT: 257.8 LBS | DIASTOLIC BLOOD PRESSURE: 78 MMHG | SYSTOLIC BLOOD PRESSURE: 124 MMHG | BODY MASS INDEX: 41.43 KG/M2

## 2024-04-01 DIAGNOSIS — Z30.2 REQUEST FOR STERILIZATION: ICD-10-CM

## 2024-04-01 DIAGNOSIS — Z76.89 ENCOUNTER FOR BIOPSY: ICD-10-CM

## 2024-04-01 DIAGNOSIS — N92.0 MENORRHAGIA WITH REGULAR CYCLE: Primary | ICD-10-CM

## 2024-04-01 PROCEDURE — 81025 URINE PREGNANCY TEST: CPT | Performed by: OBSTETRICS & GYNECOLOGY

## 2024-04-01 PROCEDURE — 58100 BIOPSY OF UTERUS LINING: CPT | Performed by: OBSTETRICS & GYNECOLOGY

## 2024-04-01 PROCEDURE — 99214 OFFICE O/P EST MOD 30 MIN: CPT | Performed by: OBSTETRICS & GYNECOLOGY

## 2024-04-01 RX ORDER — MISOPROSTOL 200 UG/1
200 TABLET ORAL ONCE
Qty: 1 TABLET | Refills: 0 | Status: SHIPPED | OUTPATIENT
Start: 2024-04-01 | End: 2024-04-01

## 2024-04-02 RX ORDER — FERROUS GLUCONATE 324(38)MG
324 TABLET ORAL
Qty: 90 TABLET | Refills: 0 | Status: SHIPPED | OUTPATIENT
Start: 2024-04-02

## 2024-04-08 ENCOUNTER — TELEPHONE (OUTPATIENT)
Dept: OBSTETRICS AND GYNECOLOGY | Age: 44
End: 2024-04-08
Payer: COMMERCIAL

## 2024-04-08 DIAGNOSIS — N85.01 COMPLEX ENDOMETRIAL HYPERPLASIA WITHOUT ATYPIA: Primary | ICD-10-CM

## 2024-04-08 NOTE — TELEPHONE ENCOUNTER
Called Hector and reviewed her pathology results. Discussed complex endometrial hyperplasia noted without atypia. Reviewed that endometrial ablation is not an option due to hyperplasia. Reviewed treatment would typically be progestin therapy. She declines progestin therapy. Reviewed D&C with hysteroscopy then recommended to rule out atypia with close observation following. She prefers to proceed with hysterectomy for treatment of bleeding. Recommend D&C to rule out atypia/endometrial malignancy then schedule hyst. Discussed option of consult with GYN Onc to proceed with hyst/possible frozen section. She desires to proceed with hysterectomy at this time and agrees with GYN Onc consult. Orders placed.

## 2024-05-22 ENCOUNTER — OFFICE VISIT (OUTPATIENT)
Dept: MAMMOGRAPHY | Facility: CLINIC | Age: 44
End: 2024-05-22
Payer: COMMERCIAL

## 2024-05-22 VITALS
WEIGHT: 252 LBS | BODY MASS INDEX: 40.5 KG/M2 | SYSTOLIC BLOOD PRESSURE: 112 MMHG | DIASTOLIC BLOOD PRESSURE: 70 MMHG | HEIGHT: 66 IN | HEART RATE: 98 BPM | OXYGEN SATURATION: 97 %

## 2024-05-22 DIAGNOSIS — Z91.89 AT HIGH RISK FOR BREAST CANCER: Primary | ICD-10-CM

## 2024-05-22 PROCEDURE — 99213 OFFICE O/P EST LOW 20 MIN: CPT | Performed by: SURGERY

## 2024-05-22 NOTE — PROGRESS NOTES
Subjective   Hector Gilbert is a 43 y.o. female     History of Present Illness   She is a nice 43-year-old black female noted to be at high risk for developing breast cancer.  Previous risk assessment has estimated her lifetime risk between 31 and 39%.  Her 5-year risk is 0.9%.  We have been obtaining supplemental MRI imaging in addition to yearly 3D mammography.  In February she had mammograms which revealed some calcifications in the right breast.  They were felt to be suspicious on diagnostic imaging and she ultimately had a stereotactic breast biopsy performed.  Fortunately the path report showed fibroadenomatoid hyperplasia with fibrocystic change and microcalcifications.  This was all felt to be concordant.  She also had an MRI performed in November 2023 which did not show any suspicious masslike or non-mass-like enhancement.  All the lymph nodes look fine.    The patient has not palpated anything of concern.    Approximately 1 week ago the patient had a laparoscopic hysterectomy that has reportedly gone well.      Review of Systems constitutional-no unusual changes in weight or appetite.  Past Medical History:   Diagnosis Date    Arthritis     Gastroparesis 02/2021    Polycystic ovary syndrome      Past Surgical History:   Procedure Laterality Date    BREAST LUMPECTOMY Left     radial scar    CARPAL TUNNEL RELEASE Right 04/2021    CARPAL TUNNEL RELEASE Left 03/2021    SPINAL FIXATION SURGERY      SPINE SURGERY       Family History   Problem Relation Age of Onset    No Known Problems Father     Hypertension Mother     Coronary artery disease Maternal Grandmother     Diabetes Maternal Grandmother     Multiple myeloma Maternal Aunt     No Known Problems Maternal Uncle     No Known Problems Paternal Aunt     No Known Problems Paternal Uncle     Colon cancer Paternal Cousin 42    Ovarian cancer Neg Hx     Uterine cancer Neg Hx     Melanoma Neg Hx     Prostate cancer Neg Hx     Breast cancer Neg Hx      Social  History     Socioeconomic History    Marital status: Single    Number of children: 0   Tobacco Use    Smoking status: Never     Passive exposure: Never    Smokeless tobacco: Never   Vaping Use    Vaping status: Never Used   Substance and Sexual Activity    Alcohol use: Never    Drug use: No    Sexual activity: Not Currently     Partners: Male     Birth control/protection: Abstinence       Objective   Physical Exam  Constitutional-BMI 40.7, no acute distress  Right breast-large and symmetrical.  The skin of the breast looks normal and there was no nipple discharge.  She does have a well-healed scar along the superior edge of the areola.  With the arms raised and the pectoralis muscle contracted, there was no skin dimpling or nipple retraction.  I did not palpate any worrisome masses.  Left breast-large and symmetrical with no changes to the skin of the breast or any nipple discharge.  With the arms maneuver there was no skin dimpling or nipple retraction.  I did not palpate any worrisome masses in the breast.  Lymphatics-no cervical or axillary adenopathy.      Assessment & Plan   1.  At high risk for breast cancer-the patient's imaging is in good order.  She did end up requiring a biopsy for some suspicious microcalcifications but this was all fine.  Her next MRI is due in November 2024.  Orders were placed for that today and I will call her with those results.  If everything looks in good order I will see her back in the office in 1 year.  Her next mammograms are due in April 2024.    2.  Obesity-the patient was given information regarding unhealthy weight gain and will be included in her after visit summary.  There were no encounter diagnoses.

## 2024-05-22 NOTE — LETTER
May 22, 2024     Candice Walsh MD  [address]  [address]  [place] [id number]    Patient: Hector Gilbert   YOB: 1980   Date of Visit: 5/22/2024     Dear Candice Walsh MD:       Thank you for referring Hector Gilbert to me for evaluation. Below are the relevant portions of my assessment and plan of care.    If you have questions, please do not hesitate to call me. I look forward to following Hector along with you.         Sincerely,        Michael Woodward MD        CC: MITCH Lopez, Michael ALDANA MD  05/22/24 1515  Sign when Signing Visit  Subjective  Hector Gilbert is a 43 y.o. female     History of Present Illness   She is a nice 43-year-old black female noted to be at high risk for developing breast cancer.  Previous risk assessment has estimated her lifetime risk between 31 and 39%.  Her 5-year risk is 0.9%.  We have been obtaining supplemental MRI imaging in addition to yearly 3D mammography.  In February she had mammograms which revealed some calcifications in the right breast.  They were felt to be suspicious on diagnostic imaging and she ultimately had a stereotactic breast biopsy performed.  Fortunately the path report showed fibroadenomatoid hyperplasia with fibrocystic change and microcalcifications.  This was all felt to be concordant.  She also had an MRI performed in November 2023 which did not show any suspicious masslike or non-mass-like enhancement.  All the lymph nodes look fine.    The patient has not palpated anything of concern.    Approximately 1 week ago the patient had a laparoscopic hysterectomy that has reportedly gone well.      Review of Systems constitutional-no unusual changes in weight or appetite.  Past Medical History:   Diagnosis Date   • Arthritis    • Gastroparesis 02/2021   • Polycystic ovary syndrome      Past Surgical History:   Procedure Laterality Date   • BREAST LUMPECTOMY Left     radial scar   • CARPAL  TUNNEL RELEASE Right 04/2021   • CARPAL TUNNEL RELEASE Left 03/2021   • SPINAL FIXATION SURGERY     • SPINE SURGERY       Family History   Problem Relation Age of Onset   • No Known Problems Father    • Hypertension Mother    • Coronary artery disease Maternal Grandmother    • Diabetes Maternal Grandmother    • Multiple myeloma Maternal Aunt    • No Known Problems Maternal Uncle    • No Known Problems Paternal Aunt    • No Known Problems Paternal Uncle    • Colon cancer Paternal Cousin 42   • Ovarian cancer Neg Hx    • Uterine cancer Neg Hx    • Melanoma Neg Hx    • Prostate cancer Neg Hx    • Breast cancer Neg Hx      Social History     Socioeconomic History   • Marital status: Single   • Number of children: 0   Tobacco Use   • Smoking status: Never     Passive exposure: Never   • Smokeless tobacco: Never   Vaping Use   • Vaping status: Never Used   Substance and Sexual Activity   • Alcohol use: Never   • Drug use: No   • Sexual activity: Not Currently     Partners: Male     Birth control/protection: Abstinence       Objective  Physical Exam  Constitutional-BMI 40.7, no acute distress  Right breast-large and symmetrical.  The skin of the breast looks normal and there was no nipple discharge.  She does have a well-healed scar along the superior edge of the areola.  With the arms raised and the pectoralis muscle contracted, there was no skin dimpling or nipple retraction.  I did not palpate any worrisome masses.  Left breast-large and symmetrical with no changes to the skin of the breast or any nipple discharge.  With the arms maneuver there was no skin dimpling or nipple retraction.  I did not palpate any worrisome masses in the breast.  Lymphatics-no cervical or axillary adenopathy.      Assessment & Plan  1.  At high risk for breast cancer-the patient's imaging is in good order.  She did end up requiring a biopsy for some suspicious microcalcifications but this was all fine.  Her next MRI is due in November 2024.   Orders were placed for that today and I will call her with those results.  If everything looks in good order I will see her back in the office in 1 year.  Her next mammograms are due in April 2024.    2.  Obesity-the patient was given information regarding unhealthy weight gain and will be included in her after visit summary.  There were no encounter diagnoses.

## 2024-06-04 ENCOUNTER — OFFICE VISIT (OUTPATIENT)
Dept: INTERNAL MEDICINE | Facility: CLINIC | Age: 44
End: 2024-06-04
Payer: COMMERCIAL

## 2024-06-04 VITALS
HEIGHT: 66 IN | RESPIRATION RATE: 12 BRPM | DIASTOLIC BLOOD PRESSURE: 80 MMHG | HEART RATE: 78 BPM | SYSTOLIC BLOOD PRESSURE: 130 MMHG | TEMPERATURE: 98.8 F | BODY MASS INDEX: 41.5 KG/M2 | WEIGHT: 258.2 LBS | OXYGEN SATURATION: 99 %

## 2024-06-04 DIAGNOSIS — E78.2 MIXED HYPERLIPIDEMIA: ICD-10-CM

## 2024-06-04 DIAGNOSIS — K21.9 GASTROESOPHAGEAL REFLUX DISEASE WITHOUT ESOPHAGITIS: ICD-10-CM

## 2024-06-04 DIAGNOSIS — R30.0 DYSURIA: Primary | ICD-10-CM

## 2024-06-04 PROBLEM — D21.9 LEIOMYOMA: Status: RESOLVED | Noted: 2017-05-12 | Resolved: 2024-06-04

## 2024-06-04 PROBLEM — Z98.890 OTHER SPECIFIED POSTPROCEDURAL STATES: Status: RESOLVED | Noted: 2018-08-07 | Resolved: 2024-06-04

## 2024-06-04 PROBLEM — Z98.890 S/P LUMBAR LAMINECTOMY: Status: RESOLVED | Noted: 2018-08-07 | Resolved: 2024-06-04

## 2024-06-04 PROBLEM — M54.9 BACK PAIN: Status: RESOLVED | Noted: 2017-11-20 | Resolved: 2024-06-04

## 2024-06-04 PROBLEM — M47.16 LUMBAR SPONDYLOSIS WITH MYELOPATHY: Status: RESOLVED | Noted: 2018-07-12 | Resolved: 2024-06-04

## 2024-06-04 PROBLEM — Z91.89 AT HIGH RISK FOR BREAST CANCER: Status: RESOLVED | Noted: 2023-09-25 | Resolved: 2024-06-04

## 2024-06-04 PROBLEM — R92.8 ABNORMAL FINDING ON BREAST IMAGING: Status: RESOLVED | Noted: 2024-02-27 | Resolved: 2024-06-04

## 2024-06-04 PROBLEM — D44.9: Status: RESOLVED | Noted: 2018-07-20 | Resolved: 2024-06-04

## 2024-06-04 PROBLEM — D43.9: Status: RESOLVED | Noted: 2018-07-20 | Resolved: 2024-06-04

## 2024-06-04 PROBLEM — D49.7 INTRADURAL TUMOR: Status: RESOLVED | Noted: 2018-07-20 | Resolved: 2024-06-04

## 2024-06-04 PROBLEM — M54.32 SCIATICA OF LEFT SIDE: Status: RESOLVED | Noted: 2018-02-15 | Resolved: 2024-06-04

## 2024-06-04 PROBLEM — M47.817 LUMBOSACRAL SPONDYLOSIS WITHOUT MYELOPATHY: Status: RESOLVED | Noted: 2022-06-15 | Resolved: 2024-06-04

## 2024-06-04 PROBLEM — R11.2 NAUSEA AND VOMITING: Status: RESOLVED | Noted: 2022-07-07 | Resolved: 2024-06-04

## 2024-06-04 PROBLEM — M70.61 TROCHANTERIC BURSITIS OF RIGHT HIP: Status: RESOLVED | Noted: 2022-06-15 | Resolved: 2024-06-04

## 2024-06-04 PROBLEM — K59.00 CONSTIPATION: Status: RESOLVED | Noted: 2020-09-16 | Resolved: 2024-06-04

## 2024-06-04 PROCEDURE — 99214 OFFICE O/P EST MOD 30 MIN: CPT | Performed by: NURSE PRACTITIONER

## 2024-06-04 RX ORDER — ESOMEPRAZOLE MAGNESIUM 40 MG/1
40 CAPSULE, DELAYED RELEASE ORAL
Qty: 90 CAPSULE | Refills: 3 | Status: SHIPPED | OUTPATIENT
Start: 2024-06-04

## 2024-06-04 RX ORDER — ROSUVASTATIN CALCIUM 10 MG/1
10 TABLET, COATED ORAL NIGHTLY
Qty: 90 TABLET | Refills: 1 | Status: SHIPPED | OUTPATIENT
Start: 2024-06-04

## 2024-06-04 NOTE — PROGRESS NOTES
Subjective   Hector Gilbert is a 43 y.o. female. Patient is here today for   Chief Complaint   Patient presents with    Med Refill    Heartburn    Difficulty Urinating          Vitals:    06/04/24 0802   BP: 130/80   Pulse: 78   Resp: 12   Temp: 98.8 °F (37.1 °C)   SpO2: 99%     Body mass index is 41.67 kg/m².  The following portions of the patient's history were reviewed and updated as appropriate: allergies, current medications, past family history, past medical history, past social history, past surgical history and problem list.    Past Medical History:   Diagnosis Date    Arthritis     Gastroparesis 02/2021    Leiomyoma 05/12/2017    Polycystic ovary syndrome       Allergies   Allergen Reactions    Amlodipine Other (See Comments)     Swelling       Social History     Socioeconomic History    Marital status: Single    Number of children: 0   Tobacco Use    Smoking status: Never     Passive exposure: Never    Smokeless tobacco: Never   Vaping Use    Vaping status: Never Used   Substance and Sexual Activity    Alcohol use: Never    Drug use: No    Sexual activity: Not Currently     Partners: Male     Birth control/protection: Abstinence        Current Outpatient Medications:     dronabinol (MARINOL) 5 MG capsule, Take 1 capsule by mouth 2 (Two) Times a Day Before Meals., Disp: , Rfl:     esomeprazole (nexIUM) 40 MG capsule, Take 1 capsule by mouth Every Morning Before Breakfast., Disp: 90 capsule, Rfl: 3    famotidine (PEPCID) 20 MG tablet, Every Night., Disp: , Rfl:     ferrous gluconate (FERGON) 324 MG tablet, Take 1 tablet by mouth Daily With Breakfast., Disp: 90 tablet, Rfl: 0    levocetirizine (XYZAL) 5 MG tablet, TK 1 T PO D IN THE CARRIE, Disp: , Rfl: 2    linaclotide (LINZESS) 290 MCG capsule capsule, Take 1 capsule by mouth., Disp: , Rfl:     losartan (COZAAR) 50 MG tablet, Take 1 tablet by mouth Daily., Disp: 90 tablet, Rfl: 1    NON FORMULARY, Domperidone, Disp: , Rfl:     ondansetron ODT (ZOFRAN-ODT) 4  MG disintegrating tablet, Place 1 tablet on the tongue Every 8 (Eight) Hours As Needed for Nausea or Vomiting. (Patient taking differently: Place 1 tablet on the tongue As Needed for Nausea or Vomiting.), Disp: 15 tablet, Rfl: 0    rosuvastatin (CRESTOR) 10 MG tablet, Take 1 tablet by mouth Every Night., Disp: 90 tablet, Rfl: 1    vitamin D3 125 MCG (5000 UT) capsule capsule, , Disp: , Rfl:   No current facility-administered medications for this visit.    Facility-Administered Medications Ordered in Other Visits:     diazePAM (VALIUM) tablet 5 mg, 5 mg, Oral, Once PRN, Candice Walsh MD     Objective     Heartburn  Associated symptoms include fatigue.   Difficulty Urinating   Pertinent negatives include no flank pain, frequency or hematuria.     Hector is a 43 year old female patient who is here for a follow up and for medication refills. She has GERD and gastroparesis and is followed by [facility]. She has GERD and takes esomeprazole daily. Her previous PCP prescribed this for her and it is not prescribed by GI. She reports GERD is well controlled and she only has break through symptoms when she eats tomatoes. She had an EGD last in November 2020.   She is almost one month post up partial hysterectomy done by Dr Neely at [facility] . She reports she had some pain with urination and difficulty urinating a few days post op and was given nitrofurantoin . She felt better for awhile but her symptoms returned. She has had decreased urination and some pain with urination   The following data was reviewed by: MITCH Price on 06/04/2024:  Common labs          9/25/2023    15:11 2/8/2024    09:41 2/26/2024    16:31   Common Labs   Glucose  95     BUN  13     Creatinine  0.92     Sodium  142     Potassium  4.2     Chloride  105     Calcium  9.0     Total Protein  7.1     Albumin  3.9     Total Bilirubin  0.3     Alkaline Phosphatase  89     AST (SGOT)  11     ALT (SGPT)  7     WBC 7.16  5.55  6.3     Hemoglobin 10.7  12.1  13.3    Hematocrit 33.4  37.1  38.8    Platelets 363  365  411    Hemoglobin A1C  5.90       Data reviewed : Consultant notes GI and GYN onc, Recent hospitalization notes [facility] , and GI studies egd      Review of Systems   Constitutional:  Positive for diaphoresis and fatigue. Negative for fever.   Respiratory: Negative.     Cardiovascular: Negative.    Gastrointestinal:  Negative for constipation and diarrhea.        GERD is well controlled as described in HPI    Genitourinary:  Positive for difficulty urinating and dysuria. Negative for flank pain, frequency, hematuria, pelvic pain, vaginal bleeding, vaginal discharge and vaginal pain.       Physical Exam  Vitals and nursing note reviewed.   Constitutional:       General: She is not in acute distress.  HENT:      Head: Normocephalic.   Cardiovascular:      Rate and Rhythm: Normal rate and regular rhythm.      Heart sounds: Normal heart sounds.   Pulmonary:      Effort: Pulmonary effort is normal.      Breath sounds: Normal breath sounds.   Abdominal:      General: Bowel sounds are normal.      Palpations: Abdomen is soft.   Skin:     General: Skin is warm and dry.   Neurological:      Mental Status: She is alert.         Assessment    ASSESSMENT    Problems Addressed this Visit       Gastroesophageal reflux disease    Relevant Medications    esomeprazole (nexIUM) 40 MG capsule     Other Visit Diagnoses       Dysuria    -  Primary    Relevant Orders    Urinalysis With Culture If Indicated - Urine, Clean Catch    Mixed hyperlipidemia        Relevant Medications    rosuvastatin (CRESTOR) 10 MG tablet          Diagnoses         Codes Comments    Dysuria    -  Primary ICD-10-CM: R30.0  ICD-9-CM: 788.1     Gastroesophageal reflux disease without esophagitis     ICD-10-CM: K21.9  ICD-9-CM: 530.81     Mixed hyperlipidemia     ICD-10-CM: E78.2  ICD-9-CM: 272.2             PLAN    Dysuria following Hysterectomy- was recently treated with  nitrofurantoin , recommend pushing fluids and will check UA with culture if indicated today  GERD- has been on nexium which works well for her, is followed by GI and has had recent EGD. Rx sent to pharmacy  [facility]- crestor refilled. Scheduled for fasting labs in 3 months     Return for Next scheduled follow up. Or sooner if needed

## 2024-06-05 LAB
APPEARANCE UR: CLEAR
BACTERIA #/AREA URNS HPF: NORMAL /[HPF]
BILIRUB UR QL STRIP: NEGATIVE
CASTS URNS QL MICRO: NORMAL /LPF
COLOR UR: YELLOW
EPI CELLS #/AREA URNS HPF: NORMAL /HPF (ref 0–10)
GLUCOSE UR QL STRIP: NEGATIVE
HGB UR QL STRIP: NEGATIVE
KETONES UR QL STRIP: NEGATIVE
LEUKOCYTE ESTERASE UR QL STRIP: NEGATIVE
MICRO URNS: NORMAL
MICRO URNS: NORMAL
NITRITE UR QL STRIP: NEGATIVE
PH UR STRIP: 6.5 [PH] (ref 5–7.5)
PROT UR QL STRIP: NEGATIVE
RBC #/AREA URNS HPF: NORMAL /HPF (ref 0–2)
SP GR UR STRIP: 1.01 (ref 1–1.03)
URINALYSIS REFLEX: NORMAL
UROBILINOGEN UR STRIP-MCNC: 0.2 MG/DL (ref 0.2–1)
WBC #/AREA URNS HPF: NORMAL /HPF (ref 0–5)

## 2024-06-11 ENCOUNTER — TRANSCRIBE ORDERS (OUTPATIENT)
Dept: ADMINISTRATIVE | Facility: HOSPITAL | Age: 44
End: 2024-06-11
Payer: COMMERCIAL

## 2024-06-11 DIAGNOSIS — Z12.31 BREAST CANCER SCREENING BY MAMMOGRAM: Primary | ICD-10-CM

## 2024-07-15 RX ORDER — MISOPROSTOL 200 UG/1
TABLET ORAL
Qty: 1 TABLET | Refills: 0 | OUTPATIENT
Start: 2024-07-15

## 2024-08-30 RX ORDER — LOSARTAN POTASSIUM 50 MG/1
50 TABLET ORAL DAILY
Qty: 90 TABLET | Refills: 1 | Status: SHIPPED | OUTPATIENT
Start: 2024-08-30

## 2024-09-10 DIAGNOSIS — E78.2 MIXED HYPERLIPIDEMIA: Primary | ICD-10-CM

## 2024-09-10 DIAGNOSIS — I10 PRIMARY HYPERTENSION: ICD-10-CM

## 2024-09-10 DIAGNOSIS — R73.03 PREDIABETES: ICD-10-CM

## 2024-09-17 LAB
ALBUMIN SERPL-MCNC: 3.9 G/DL (ref 3.5–5.2)
ALBUMIN/GLOB SERPL: 1.4 G/DL
ALP SERPL-CCNC: 86 U/L (ref 39–117)
ALT SERPL-CCNC: 11 U/L (ref 1–33)
AST SERPL-CCNC: 14 U/L (ref 1–32)
BASOPHILS # BLD AUTO: 0.03 10*3/MM3 (ref 0–0.2)
BASOPHILS NFR BLD AUTO: 0.6 % (ref 0–1.5)
BILIRUB SERPL-MCNC: 0.4 MG/DL (ref 0–1.2)
BUN SERPL-MCNC: 10 MG/DL (ref 6–20)
BUN/CREAT SERPL: 10.8 (ref 7–25)
CALCIUM SERPL-MCNC: 8.9 MG/DL (ref 8.6–10.5)
CHLORIDE SERPL-SCNC: 105 MMOL/L (ref 98–107)
CHOLEST SERPL-MCNC: 139 MG/DL (ref 0–200)
CO2 SERPL-SCNC: 24.4 MMOL/L (ref 22–29)
CREAT SERPL-MCNC: 0.93 MG/DL (ref 0.57–1)
EGFRCR SERPLBLD CKD-EPI 2021: 78.4 ML/MIN/1.73
EOSINOPHIL # BLD AUTO: 0.13 10*3/MM3 (ref 0–0.4)
EOSINOPHIL NFR BLD AUTO: 2.8 % (ref 0.3–6.2)
ERYTHROCYTE [DISTWIDTH] IN BLOOD BY AUTOMATED COUNT: 13.5 % (ref 12.3–15.4)
GLOBULIN SER CALC-MCNC: 2.8 GM/DL
GLUCOSE SERPL-MCNC: 90 MG/DL (ref 65–99)
HBA1C MFR BLD: 5.7 % (ref 4.8–5.6)
HCT VFR BLD AUTO: 40 % (ref 34–46.6)
HDLC SERPL-MCNC: 45 MG/DL (ref 40–60)
HGB BLD-MCNC: 13.5 G/DL (ref 12–15.9)
IMM GRANULOCYTES # BLD AUTO: 0.01 10*3/MM3 (ref 0–0.05)
IMM GRANULOCYTES NFR BLD AUTO: 0.2 % (ref 0–0.5)
LDLC SERPL CALC-MCNC: 71 MG/DL (ref 0–100)
LDLC/HDLC SERPL: 1.52 {RATIO}
LYMPHOCYTES # BLD AUTO: 1.26 10*3/MM3 (ref 0.7–3.1)
LYMPHOCYTES NFR BLD AUTO: 26.8 % (ref 19.6–45.3)
MCH RBC QN AUTO: 28.8 PG (ref 26.6–33)
MCHC RBC AUTO-ENTMCNC: 33.8 G/DL (ref 31.5–35.7)
MCV RBC AUTO: 85.3 FL (ref 79–97)
MONOCYTES # BLD AUTO: 0.45 10*3/MM3 (ref 0.1–0.9)
MONOCYTES NFR BLD AUTO: 9.6 % (ref 5–12)
NEUTROPHILS # BLD AUTO: 2.83 10*3/MM3 (ref 1.7–7)
NEUTROPHILS NFR BLD AUTO: 60 % (ref 42.7–76)
NRBC BLD AUTO-RTO: 0 /100 WBC (ref 0–0.2)
PLATELET # BLD AUTO: 370 10*3/MM3 (ref 140–450)
POTASSIUM SERPL-SCNC: 4.2 MMOL/L (ref 3.5–5.2)
PROT SERPL-MCNC: 6.7 G/DL (ref 6–8.5)
RBC # BLD AUTO: 4.69 10*6/MM3 (ref 3.77–5.28)
SODIUM SERPL-SCNC: 139 MMOL/L (ref 136–145)
TRIGL SERPL-MCNC: 129 MG/DL (ref 0–150)
TSH SERPL DL<=0.005 MIU/L-ACNC: 1.19 UIU/ML (ref 0.27–4.2)
VLDLC SERPL CALC-MCNC: 23 MG/DL (ref 5–40)
WBC # BLD AUTO: 4.71 10*3/MM3 (ref 3.4–10.8)

## 2024-09-20 ENCOUNTER — OFFICE VISIT (OUTPATIENT)
Dept: INTERNAL MEDICINE | Facility: CLINIC | Age: 44
End: 2024-09-20
Payer: COMMERCIAL

## 2024-09-20 VITALS
BODY MASS INDEX: 41.17 KG/M2 | DIASTOLIC BLOOD PRESSURE: 90 MMHG | RESPIRATION RATE: 12 BRPM | SYSTOLIC BLOOD PRESSURE: 121 MMHG | HEART RATE: 92 BPM | OXYGEN SATURATION: 98 % | WEIGHT: 256.2 LBS | TEMPERATURE: 98.7 F | HEIGHT: 66 IN

## 2024-09-20 DIAGNOSIS — R07.2 PRECORDIAL PAIN: ICD-10-CM

## 2024-09-20 DIAGNOSIS — G47.33 OSA (OBSTRUCTIVE SLEEP APNEA): ICD-10-CM

## 2024-09-20 DIAGNOSIS — R73.03 PREDIABETES: ICD-10-CM

## 2024-09-20 DIAGNOSIS — I10 PRIMARY HYPERTENSION: ICD-10-CM

## 2024-09-20 DIAGNOSIS — E78.2 MIXED HYPERLIPIDEMIA: ICD-10-CM

## 2024-09-20 DIAGNOSIS — Z00.00 ANNUAL PHYSICAL EXAM: Primary | ICD-10-CM

## 2024-09-20 DIAGNOSIS — G47.9 DIFFICULTY SLEEPING: ICD-10-CM

## 2024-09-20 PROCEDURE — 99396 PREV VISIT EST AGE 40-64: CPT | Performed by: NURSE PRACTITIONER

## 2024-10-11 ENCOUNTER — OFFICE VISIT (OUTPATIENT)
Dept: CARDIOLOGY | Facility: CLINIC | Age: 44
End: 2024-10-11
Payer: COMMERCIAL

## 2024-10-11 ENCOUNTER — PATIENT ROUNDING (BHMG ONLY) (OUTPATIENT)
Dept: CARDIOLOGY | Facility: CLINIC | Age: 44
End: 2024-10-11
Payer: COMMERCIAL

## 2024-10-11 VITALS
OXYGEN SATURATION: 98 % | SYSTOLIC BLOOD PRESSURE: 122 MMHG | HEIGHT: 66 IN | BODY MASS INDEX: 41.14 KG/M2 | DIASTOLIC BLOOD PRESSURE: 70 MMHG | WEIGHT: 256 LBS | HEART RATE: 84 BPM

## 2024-10-11 DIAGNOSIS — E66.01 MORBID OBESITY WITH BMI OF 40.0-44.9, ADULT: ICD-10-CM

## 2024-10-11 DIAGNOSIS — R07.2 PRECORDIAL CHEST PAIN: Primary | ICD-10-CM

## 2024-10-11 DIAGNOSIS — K31.84 GASTROPARESIS: ICD-10-CM

## 2024-10-11 DIAGNOSIS — E78.00 HYPERCHOLESTEROLEMIA: ICD-10-CM

## 2024-10-11 DIAGNOSIS — I10 ESSENTIAL HYPERTENSION: ICD-10-CM

## 2024-10-11 PROCEDURE — 93000 ELECTROCARDIOGRAM COMPLETE: CPT | Performed by: INTERNAL MEDICINE

## 2024-10-11 PROCEDURE — 99204 OFFICE O/P NEW MOD 45 MIN: CPT | Performed by: INTERNAL MEDICINE

## 2024-10-11 NOTE — PROGRESS NOTES
PATIENTINFORMATION    Date of Office Visit: 10/11/2024  Encounter Provider: Kingston Lea MD  Place of Service: [facility] CARDIOLOGY  Patient Name: Hector Gilbert  : 1980    Subjective:     Encounter Date:10/11/2024      Patient ID: Hector Gilbert is a 44 y.o. female.    Chief Complaint   Patient presents with    Chest Pain       HPI  Ms. Gilbert is a pleasant 44 years old lady who came to cardiology clinic for further evaluation treatment of intermittent chest pain.  She has had intermittent sharp left sided chest pain right below the breast for the past 6 years without any known exacerbating or relieving factor.  It usually occurs once or twice every month and she had several ER visits.  Denies having any prior stress testing or coronary angiogram.  Pain usually last this for several hours and resolve spontaneously.  He is a walking or moving his shoulder sometimes make pain worse.  No significant breathing change.  Denies palpitations, presyncope syncope or extremity swelling.  No family history of premature coronary artery disease  She has a fairly well-controlled hypertension and hyperlipidemia with medications.    She has chronic degenerative back disease with prior surgeries to remove tumor and unable to exercise regularly.  So for minutes      ROS  All systems reviewed and negative except as noted in HPI.    Past Medical History:   Diagnosis Date    Arthritis     Gastroparesis 2021    Leiomyoma 2017    Polycystic ovary syndrome        Past Surgical History:   Procedure Laterality Date    BREAST LUMPECTOMY Left     radial scar    CARPAL TUNNEL RELEASE Right 2021    CARPAL TUNNEL RELEASE Left 2021    SPINAL FIXATION SURGERY      SPINE SURGERY         Social History     Socioeconomic History    Marital status: Single    Number of children: 0   Tobacco Use    Smoking status: Never     Passive exposure: Never    Smokeless tobacco: Never   Vaping Use     "Vaping status: Never Used   Substance and Sexual Activity    Alcohol use: Never    Drug use: No    Sexual activity: Not Currently     Partners: Male     Birth control/protection: Abstinence       Family History   Problem Relation Age of Onset    No Known Problems Father     Hypertension Mother     Coronary artery disease Maternal Grandmother     Diabetes Maternal Grandmother     Multiple myeloma Maternal Aunt     No Known Problems Maternal Uncle     No Known Problems Paternal Aunt     No Known Problems Paternal Uncle     Colon cancer Paternal Cousin 42    Ovarian cancer Neg Hx     Uterine cancer Neg Hx     Melanoma Neg Hx     Prostate cancer Neg Hx     Breast cancer Neg Hx            ECG 12 Lead    Date/Time: 10/11/2024 3:11 PM  Performed by: Kingston Lea MD    Authorized by: Kingston Lea MD  Comparison: compared with previous ECG from 9/13/2024  Similar to previous ECG  Rhythm: sinus rhythm  Rate: normal  Conduction: conduction normal  ST Segments: ST segments normal  T Waves: T waves normal  QRS axis: normal  Other: no other findings    Clinical impression: normal ECG             Objective:     /70   Pulse 84   Ht 167.6 cm (66\")   Wt 116 kg (256 lb)   LMP 03/22/2024 (Exact Date)   SpO2 98%   BMI 41.32 kg/m²  Body mass index is 41.32 kg/m².     Constitutional:       General: Not in acute distress.     Appearance: Morbidly obese. Not diaphoretic.   Eyes:      Pupils: Pupils are equal, round, and reactive to light.   HENT:      Head: Normocephalic and atraumatic.   Neck:      Thyroid: No thyromegaly.   Pulmonary:      Effort: Pulmonary effort is normal. No respiratory distress.      Breath sounds: Normal breath sounds. No wheezing. No rales.   Chest:      Chest wall: Not tender to palpatation.   Cardiovascular:      Normal rate. Regular rhythm.      No gallop.    Pulses:     Intact distal pulses.   Edema:     Peripheral edema absent.   Abdominal:      General: Bowel sounds are normal. " There is no distension.      Palpations: Abdomen is soft.      Tenderness: There is no guarding.   Musculoskeletal: Normal range of motion.         General: No deformity.      Cervical back: Normal range of motion and neck supple. Skin:     General: Skin is warm and dry.      Findings: No rash.   Neurological:      Mental Status: Alert and oriented to person, place, and time.      Cranial Nerves: No cranial nerve deficit.      Deep Tendon Reflexes: Reflexes are normal and symmetric.   Psychiatric:         Judgment: Judgment normal.         Review Of Data: I have reviewed pertinent recent labs, images and documents and pertinent findings included in HPI or assessment below.    Lipid Panel          9/17/2024    08:06   Lipid Panel   Total Cholesterol 139    Triglycerides 129    HDL Cholesterol 45    VLDL Cholesterol 23    LDL Cholesterol  71    LDL/HDL Ratio 1.52          Assessment/Plan:   Occasional atypical left anterior chest discomfort.  Not typically exertional.  She could have it for several hours and days and resolved spontaneously.  She had several ER visits with unremarkable workup.  Suspect musculoskeletal.  Essential hypertension that is well-controlled.  Obesity with chronic degenerative joint disease interfering with activities  Hypercholesterolemia controlled with statin.  Gastroparesis for which she takes dronabinol    Office EKG unremarkable  Vital signs within range.  Normal cardiovascular examination.  I will try to do treadmill test first.    Diagnosis and plan of care discussed with patient and verbalized understanding.            Your medication list            Accurate as of October 11, 2024  3:27 PM. If you have any questions, ask your nurse or doctor.                CHANGE how you take these medications        Instructions Last Dose Given Next Dose Due   ondansetron ODT 4 MG disintegrating tablet  Commonly known as: ZOFRAN-ODT  What changed: when to take this      Place 1 tablet on the tongue  Every 8 (Eight) Hours As Needed for Nausea or Vomiting.              CONTINUE taking these medications        Instructions Last Dose Given Next Dose Due   dronabinol 5 MG capsule  Commonly known as: MARINOL      Take 1 capsule by mouth 2 (Two) Times a Day Before Meals.       esomeprazole 40 MG capsule  Commonly known as: nexIUM      Take 1 capsule by mouth Every Morning Before Breakfast.       famotidine 20 MG tablet  Commonly known as: PEPCID      Every Night.       ferrous gluconate 324 MG tablet  Commonly known as: FERGON      Take 1 tablet by mouth Daily With Breakfast.       levocetirizine 5 MG tablet  Commonly known as: XYZAL      TK 1 T PO D IN THE CARRIE       linaclotide 290 MCG capsule capsule  Commonly known as: LINZESS      Take 1 capsule by mouth.       losartan 50 MG tablet  Commonly known as: COZAAR      TAKE 1 TABLET BY MOUTH DAILY       NON FORMULARY      Domperidone       rosuvastatin 10 MG tablet  Commonly known as: CRESTOR      Take 1 tablet by mouth Every Night.       vitamin D3 125 MCG (5000 UT) capsule capsule                        Kingston Lea MD  10/11/24  15:27 EDT

## 2024-10-13 NOTE — PROGRESS NOTES
October 11, 2024    Tell me about your visit with us. What things went well?         We're always looking for ways to make our patients' experiences even better. Do you have recommendations on ways we may improve?      Overall were you satisfied with your first visit to our practice?        I appreciate you taking the time to speak with me today. Is there anything else I can do for you?       Thank you, and have a great day.

## 2024-10-15 ENCOUNTER — TELEPHONE (OUTPATIENT)
Dept: CARDIOLOGY | Facility: CLINIC | Age: 44
End: 2024-10-15
Payer: COMMERCIAL

## 2024-10-16 ENCOUNTER — HOSPITAL ENCOUNTER (OUTPATIENT)
Dept: CARDIOLOGY | Facility: HOSPITAL | Age: 44
Discharge: HOME | End: 2024-10-16
Admitting: INTERNAL MEDICINE
Payer: COMMERCIAL

## 2024-10-16 DIAGNOSIS — R07.2 PRECORDIAL CHEST PAIN: ICD-10-CM

## 2024-10-16 LAB
BH CV STRESS BP STAGE 1: NORMAL
BH CV STRESS BP STAGE 2: NORMAL
BH CV STRESS DURATION MIN STAGE 1: 3
BH CV STRESS DURATION MIN STAGE 2: 1
BH CV STRESS DURATION SEC STAGE 1: 0
BH CV STRESS DURATION SEC STAGE 2: 30
BH CV STRESS GRADE STAGE 1: 10
BH CV STRESS GRADE STAGE 2: 12
BH CV STRESS HR STAGE 1: 145
BH CV STRESS HR STAGE 2: 160
BH CV STRESS METS STAGE 1: 5
BH CV STRESS METS STAGE 2: 7
BH CV STRESS PROTOCOL 1: NORMAL
BH CV STRESS SPEED STAGE 1: 1.7
BH CV STRESS SPEED STAGE 2: 2.5
BH CV STRESS STAGE 1: 1
BH CV STRESS STAGE 2: 2
MAXIMAL PREDICTED HEART RATE: 177 BPM
PERCENT MAX PREDICTED HR: 90.4 %
STRESS BASELINE BP: NORMAL MMHG
STRESS BASELINE HR: 106 BPM
STRESS PERCENT HR: 106 %
STRESS POST ESTIMATED WORKLOAD: 7 METS
STRESS POST EXERCISE DUR MIN: 4 MIN
STRESS POST EXERCISE DUR SEC: 30 SEC
STRESS POST PEAK BP: NORMAL MMHG
STRESS POST PEAK HR: 160 BPM
STRESS TARGET HR: 150 BPM

## 2024-10-16 PROCEDURE — 93017 CV STRESS TEST TRACING ONLY: CPT

## 2024-10-20 NOTE — PROGRESS NOTES
Please notify Ms. Gilbert she did well on stress testing.  She does not have evidence for significant blockages of heart arteries.  Encouraged to start exercising regularly.  Let me know if she have any questions but otherwise follow-up in 1 year or sooner with concerns.  Thank you !

## 2024-10-25 ENCOUNTER — OFFICE VISIT (OUTPATIENT)
Dept: SLEEP MEDICINE | Facility: HOSPITAL | Age: 44
End: 2024-10-25
Payer: COMMERCIAL

## 2024-10-25 VITALS — BODY MASS INDEX: 41.62 KG/M2 | WEIGHT: 259 LBS | HEIGHT: 66 IN | HEART RATE: 90 BPM | OXYGEN SATURATION: 96 %

## 2024-10-25 DIAGNOSIS — G47.33 OSA (OBSTRUCTIVE SLEEP APNEA): Primary | ICD-10-CM

## 2024-10-25 DIAGNOSIS — E66.01 SEVERE OBESITY (BMI >= 40): ICD-10-CM

## 2024-10-25 PROCEDURE — G0463 HOSPITAL OUTPT CLINIC VISIT: HCPCS

## 2024-11-13 ENCOUNTER — HOSPITAL ENCOUNTER (OUTPATIENT)
Facility: HOSPITAL | Age: 44
Discharge: HOME | End: 2024-11-13
Payer: COMMERCIAL

## 2024-11-13 ENCOUNTER — HOSPITAL ENCOUNTER (OUTPATIENT)
Dept: MRI IMAGING | Facility: HOSPITAL | Age: 44
Discharge: HOME | End: 2024-11-13
Payer: COMMERCIAL

## 2024-11-13 DIAGNOSIS — T81.509D: ICD-10-CM

## 2024-11-13 DIAGNOSIS — Z91.89 AT HIGH RISK FOR BREAST CANCER: ICD-10-CM

## 2024-11-13 PROCEDURE — 77049 MRI BREAST C-+ W/CAD BI: CPT

## 2024-11-13 PROCEDURE — 25510000002 GADOBENATE DIMEGLUMINE 529 MG/ML SOLUTION: Performed by: SURGERY

## 2024-11-13 PROCEDURE — A9577 INJ MULTIHANCE: HCPCS | Performed by: SURGERY

## 2024-11-13 RX ADMIN — GADOBENATE DIMEGLUMINE 20 ML: 529 INJECTION, SOLUTION INTRAVENOUS at 18:07

## 2024-11-15 ENCOUNTER — TELEPHONE (OUTPATIENT)
Dept: SLEEP MEDICINE | Facility: HOSPITAL | Age: 44
End: 2024-11-15
Payer: COMMERCIAL

## 2024-11-15 NOTE — TELEPHONE ENCOUNTER
I faxed cpap order to [facility] with note asking them to remind Pt to F/U between 31-90 days after setup

## 2024-11-18 ENCOUNTER — DOCUMENTATION (OUTPATIENT)
Dept: MAMMOGRAPHY | Facility: CLINIC | Age: 44
End: 2024-11-18
Payer: COMMERCIAL

## 2024-11-18 NOTE — PROGRESS NOTES
I spoke with her today.  The recent MRI did not show any suspicious enhancement in either breast.  We will see her back in May 2025.

## 2024-11-27 ENCOUNTER — OFFICE VISIT (OUTPATIENT)
Dept: OBSTETRICS AND GYNECOLOGY | Age: 44
End: 2024-11-27
Payer: COMMERCIAL

## 2024-11-27 VITALS
HEIGHT: 66 IN | WEIGHT: 277 LBS | DIASTOLIC BLOOD PRESSURE: 64 MMHG | SYSTOLIC BLOOD PRESSURE: 122 MMHG | BODY MASS INDEX: 44.52 KG/M2

## 2024-11-27 DIAGNOSIS — N76.0 ACUTE VAGINITIS: ICD-10-CM

## 2024-11-27 DIAGNOSIS — N89.8 VAGINAL ITCHING: Primary | ICD-10-CM

## 2024-11-27 LAB
BILIRUB BLD-MCNC: NEGATIVE MG/DL
CLARITY, POC: CLEAR
COLOR UR: YELLOW
GLUCOSE UR STRIP-MCNC: NEGATIVE MG/DL
KETONES UR QL: NEGATIVE
LEUKOCYTE EST, POC: NEGATIVE
NITRITE UR-MCNC: NEGATIVE MG/ML
PH UR: 6 [PH] (ref 5–8)
PROT UR STRIP-MCNC: NEGATIVE MG/DL
RBC # UR STRIP: NEGATIVE /UL
SP GR UR: 1.01 (ref 1–1.03)
UROBILINOGEN UR QL: NORMAL

## 2024-11-27 RX ORDER — GABAPENTIN 300 MG/1
300 CAPSULE ORAL
COMMUNITY
Start: 2024-11-18 | End: 2024-12-18

## 2024-11-27 RX ORDER — ESOMEPRAZOLE MAGNESIUM 40 MG/1
40 CAPSULE, DELAYED RELEASE ORAL
COMMUNITY
Start: 2024-11-06 | End: 2025-11-06

## 2024-11-27 RX ORDER — FAMOTIDINE 20 MG/1
20 TABLET, FILM COATED ORAL
COMMUNITY
Start: 2024-11-06

## 2024-11-27 RX ORDER — ONDANSETRON 4 MG/1
4 TABLET, ORALLY DISINTEGRATING ORAL EVERY 6 HOURS PRN
COMMUNITY
Start: 2024-11-06

## 2024-11-27 RX ORDER — ACETAMINOPHEN AND CODEINE PHOSPHATE 300; 30 MG/1; MG/1
1 TABLET ORAL
COMMUNITY
Start: 2024-11-18 | End: 2024-12-18

## 2024-11-27 RX ORDER — CYCLOSPORINE 0.5 MG/ML
EMULSION OPHTHALMIC
COMMUNITY
Start: 2024-09-26

## 2024-11-27 NOTE — PROGRESS NOTES
"Get Gilbert is a 44 y.o. female is being seen today for   Chief Complaint   Patient presents with    Vaginal Discharge     Gyn F/u  Cc:Pt having discharge odor and itching   .    History of Present Illness      Patient here with change in vaginal discharge  Noting an odor and itching as well  It has been intermittent over the past few months  Today she hasn't noticed any symptoms  No new products vaginally  No new partners     The following portions of the patient's history were reviewed and updated as appropriate: allergies, current medications, past family history, past medical history, past social history, past surgical history and problem list.    /64   Ht 167.6 cm (65.98\")   Wt 126 kg (277 lb)   LMP 03/22/2024 (Exact Date)   BMI 44.73 kg/m²         Review of Systems   Constitutional: Negative.    HENT: Negative.     Eyes: Negative.    Respiratory: Negative.     Cardiovascular: Negative.    Gastrointestinal: Negative.    Endocrine: Negative.    Genitourinary: Negative.  Positive for vaginal discharge.   Musculoskeletal: Negative.    Skin: Negative.    Allergic/Immunologic: Negative.    Neurological: Negative.    Hematological: Negative.    Psychiatric/Behavioral: Negative.         Objective   Physical Exam  Constitutional:       Appearance: She is well-developed.   Genitourinary:     Vagina: Vaginal discharge present.      Uterus: Absent.       Comments: Small amount of white discharge  Skin:     General: Skin is warm and dry.   Neurological:      Mental Status: She is alert and oriented to person, place, and time.         Assessment & Plan   Diagnoses and all orders for this visit:    1. Vaginal itching (Primary)  -     POC Urinalysis Dipstick  -     NuSwab VG+ - Swab, Vagina    2. Acute vaginitis  -     NuSwab VG+ - Swab, Vagina      Discussed overall vaginal hygiene practices and prevention of BV/yeast/STDs  Discouraged the use of douching or soaps to the vaginal area, use warm " water only  Always use condoms with new partners   Keep the vaginal area clean and dry, always change out of wet work out clothes and bathing suits  Take oral probiotics  Can also use OTC boric acid suppositories for PH balance  If you have diabetes, keep your blood sugar levels under control.           Total time spent today with Hector  was 30 minutes (level 4).  Greater than 50% of the time was spent coordinating care, answering her questions and counseling regarding pathophysiology of her presenting problem along with plans for any diagnositc work-up and treatment.

## 2024-12-01 LAB
A VAGINAE DNA VAG QL NAA+PROBE: NORMAL SCORE
BVAB2 DNA VAG QL NAA+PROBE: NORMAL SCORE
C ALBICANS DNA VAG QL NAA+PROBE: NEGATIVE
C GLABRATA DNA VAG QL NAA+PROBE: NEGATIVE
C TRACH DNA SPEC QL NAA+PROBE: NEGATIVE
MEGA1 DNA VAG QL NAA+PROBE: NORMAL SCORE
N GONORRHOEA DNA VAG QL NAA+PROBE: NEGATIVE
T VAGINALIS DNA VAG QL NAA+PROBE: NEGATIVE

## 2024-12-24 ENCOUNTER — TELEPHONE (OUTPATIENT)
Dept: CARDIOLOGY | Facility: CLINIC | Age: 44
End: 2024-12-24
Payer: COMMERCIAL

## 2024-12-24 NOTE — TELEPHONE ENCOUNTER
Dewayne ([facility], [phone]) called to request a copy of most recent EKG for Hector Gilbert to be faxed to [phone].  He states patient is on domperidone and requires EKG for this medication. From LOV with GI motility clinic:        Faxed copy of EKG performed on 10/11/2024.    Thank you,  Fatuma VICK RN  Triage Nurse [facility]  12/24/24 11:34 EST

## 2024-12-24 NOTE — TELEPHONE ENCOUNTER
Fax call report shows successful transmission.    Thank you,  Fatuma VICK RN  Triage Nurse [facility]  12/24/24 11:47 EST

## 2025-01-20 ENCOUNTER — OFFICE VISIT (OUTPATIENT)
Dept: OBSTETRICS AND GYNECOLOGY | Age: 45
End: 2025-01-20
Payer: COMMERCIAL

## 2025-01-20 VITALS
DIASTOLIC BLOOD PRESSURE: 86 MMHG | BODY MASS INDEX: 45.05 KG/M2 | HEIGHT: 65 IN | WEIGHT: 270.4 LBS | SYSTOLIC BLOOD PRESSURE: 124 MMHG

## 2025-01-20 DIAGNOSIS — Z12.31 ENCOUNTER FOR SCREENING MAMMOGRAM FOR MALIGNANT NEOPLASM OF BREAST: ICD-10-CM

## 2025-01-20 DIAGNOSIS — Z01.419 ENCOUNTER FOR GYNECOLOGICAL EXAMINATION: Primary | ICD-10-CM

## 2025-01-20 DIAGNOSIS — Z91.89 AT HIGH RISK FOR BREAST CANCER: ICD-10-CM

## 2025-01-20 DIAGNOSIS — Z80.0 FAMILY HISTORY OF COLON CANCER: ICD-10-CM

## 2025-01-20 DIAGNOSIS — R23.2 HOT FLASHES: ICD-10-CM

## 2025-01-20 PROBLEM — N85.01 COMPLEX ENDOMETRIAL HYPERPLASIA WITHOUT ATYPIA: Status: RESOLVED | Noted: 2024-04-08 | Resolved: 2025-01-20

## 2025-01-20 NOTE — PROGRESS NOTES
Subjective     Chief Complaint   Patient presents with    Gynecologic Exam     Annual. Patient has concerns about hot flashes.   Last pap - 2023 Neg/HPV Neg  Mammo - 3/19/2024       History of Present Illness    Hector Gilbert is a 44 y.o.  who presents for annual exam.  She complains of occ hot flashes that have worsened since her hyst.   She denies vag bleeding or pelvic pain    Obstetric History:  OB History          0    Para   0    Term   0       0    AB   0    Living   0         SAB   0    IAB   0    Ectopic   0    Molar        Multiple   0    Live Births                   Menstrual History:     Patient's last menstrual period was 2024 (exact date).         Current contraception: status post hysterectomy; s/p hyst with removal of fallopian tubes with gyn onc ( hx hyperplasia, path benign and ovaries remain )  History of abnormal Pap smear: no  Received Gardasil immunization: no  Perform regular self breast exam:  yes  Family history of uterine or ovarian cancer: no  Family History of colon cancer: yes, mat cousin ( 40's), mother had polyps  Family history of breast cancer: no    Mammogram: up to date, due 2025  Colonoscopy: up to date, done 2021, repeat 5 yrs per surgeon  DEXA: not indicated.    Exercise: moderately active  Calcium/Vitamin D: adequate intake    The following portions of the patient's history were reviewed and updated as appropriate: allergies, current medications, past family history, past medical history, past social history, past surgical history, and problem list.    Review of Systems    Review of Systems   Constitutional: Negative for fatigue.   Respiratory: Negative for shortness of breath.    Gastrointestinal: Negative for abdominal pain.   Genitourinary: Negative for vag bleeding or pelvic pain  Neurological: Negative for headaches.   Psychiatric/Behavioral: Negative for dysphoric mood.   Endocrine: Positive for hot flashes        Objective  "  Physical Exam    /86 (BP Location: Right arm, Patient Position: Sitting, Cuff Size: Adult)   Ht 165.1 cm (65\")   Wt 123 kg (270 lb 6.4 oz)   LMP 03/22/2024 (Exact Date)   BMI 45.00 kg/m²   General:   Alert, in no distress   Heart: regular rate and rhythm   Lungs: clear to auscultation bilaterally   Breast: Inspection is negative. Left breast is without masses, retractions, nipple discharge or axillary adenopathy. Right breast is without masses, retractions, nipple discharge or axillary adenopathy.     Neck: Supple, no thyromegaly   Abdomen: Soft, no tenderness or guarding   Pelvis: External genitalia: normal general appearance  Urinary system: urethral meatus normal  Vaginal: normal mucosa without prolapse or lesions  Cervix: removed surgically, cuff without lesions  Adnexa: no masses or tenderness  Uterus: removed surgically   Extremities: Normal without edema   Neurologic: Alert and oriented   Psychiatric: Normal affect, judgment and mood     Assessment & Plan   Diagnoses and all orders for this visit:    1. Encounter for gynecological examination (Primary)    2. Encounter for screening mammogram for malignant neoplasm of breast  -     Mammo Screening Digital Tomosynthesis Bilateral With CAD; Future    3. At high risk for breast cancer    4. Family history of colon cancer    5. Hot flashes  -     Follicle Stimulating Hormone        All questions answered.  Breast self exam technique reviewed and patient encouraged to perform self-exam monthly.  Discussed healthy lifestyle modifications.  Recommended 30 minutes of aerobic exercise five times per week.  Pap def as not indicated ( hyst for hyperplasia/fibroids with benign path ) and pt agrees  Ordered mammogram due in February. Pt has scheduled in May but plans to r/s to February.     She will continue high risk breast surveillance, MRI was neg in November.    She desires genetic testing due to family hx of early colon cancer. Discussed [facility] and " handout provided. Pt desires to proceed today. Recommend f/u 3 wks to review results.

## 2025-01-21 LAB — FSH SERPL-ACNC: 2.7 MIU/ML

## 2025-01-28 ENCOUNTER — TELEPHONE (OUTPATIENT)
Dept: MAMMOGRAPHY | Facility: CLINIC | Age: 45
End: 2025-01-28
Payer: COMMERCIAL

## 2025-02-10 ENCOUNTER — OFFICE VISIT (OUTPATIENT)
Dept: OBSTETRICS AND GYNECOLOGY | Age: 45
End: 2025-02-10
Payer: COMMERCIAL

## 2025-02-10 VITALS — DIASTOLIC BLOOD PRESSURE: 80 MMHG | WEIGHT: 266 LBS | BODY MASS INDEX: 44.26 KG/M2 | SYSTOLIC BLOOD PRESSURE: 120 MMHG

## 2025-02-10 DIAGNOSIS — Z80.9 FAMILY HISTORY OF CANCER: Primary | ICD-10-CM

## 2025-02-10 DIAGNOSIS — R23.2 HOT FLASHES: ICD-10-CM

## 2025-02-10 NOTE — PROGRESS NOTES
Chief Complaint   Patient presents with    Follow-up     Discuss [facility] Result's.         HPI  Hector Gilbert is a 44 y.o. female presents to review results.          The following portions of the patient's history were reviewed and updated as appropriate: allergies, current medications, past family history, past medical history, past social history, past surgical history, and problem list.    Review of Systems  Pertinent items are noted in HPI.    /80 (BP Location: Right arm, Patient Position: Sitting, Cuff Size: Adult)   Wt 121 kg (266 lb)   LMP 03/22/2024 (Exact Date)   BMI 44.26 kg/m²         Physical Exam  Constitutional:       Appearance: Normal appearance.   Neurological:      General: No focal deficit present.      Mental Status: She is alert and oriented to person, place, and time.   Psychiatric:         Mood and Affect: Mood normal.         Behavior: Behavior normal.       [facility] results: neg for pathogenic mutation, pos for VUS X 1      Diagnoses and all orders for this visit:    1. 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 Hot flashes      Provided pt with a copy of genetic results. Discussed that even though the results are neg for pathogenic mutations, this does not mean she cannot develop cancer. It does show she does not carry the 70 screened mutations. Discussed VUS. Recommend she register result and check with [facility] annually to see if result is re-classified. She noted agreement and also desired to see local genetic counselor. Order placed.     Hot flashes: normal FSH noted. Plan f/u prn. Recommend she contact her primary MD if symptoms continue. She notes understanding.     She will continue high risk breast surveillance now with breast surgery NP. F/u for annual or prn.

## 2025-02-27 NOTE — PROGRESS NOTES
Hector Gilbert, a 44 y.o.-year-old female, was seen for genetic counseling to review the results of genetic testing. Genetic counseling was performed via telephone. Ms. Gilbert confirmed her full name, date of birth, and that she was physically located in the [facility] at the time of the appointment. she does not have a personal history of cancer. she was 11 at menarche and has no children. she is pre-menopausal and retains her ovaries. She had a hysterectomy in May of 2024 due to complex hyperplasia of the uterus. Her most recent mammogram was in 2023 and showed heterogeneously dense breast tissue. She has had 2 breast biopsies. The first biopsy in 2021 was of the left breast and showed a sclerosing lesion. The second breast biopsy in March 2024 was done on the right breast and showed duct calcifications. her last colonoscopy was 2021, she has colonoscopies every 5 years, and reports no history of  polyps. she previously had genetic testing ordered in January of 2025 by Dr. Walsh due to family history of early age of diagnosis of colon cancer. Testing was performed through the Multi-Cancer panel, and analyzed 70 genes associated with an increased risk for cancer. Genetic testing was negative for known deleterious/pathogenic (harmful) mutations by sequencing and rearrangement testing for the genes on this panel. 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 VUSs are changes in DNA that may or may not affect the function of the gene. VUSs are frequently identified through multigene panel testing, given the number of genes being evaluated and the presence of genetic variation in the population. The majority (estimated to be >90%) of VUS's are eventually reclassified as benign gene changes. It is not recommended that any unaffected relatives be tested for a VUS at this time, and management should not be altered based on a  SARAH. Ms. Gilbert was interested in discussing the information related to this finding.       FAMILY HISTORY: (See attached pedigree)  Mat. Aunt:   Multiple myeloma, 65  Mat. Uncle:  Lung cancer, 65  Mat. Cousin 1:  Throat cancer, 46  Mat. Cousin 2:  Colon cancer, 40  Pat. Grandfather: Lung cancer  Pat. Great-Aunt 1: Multiple myeloma, 70  Pat. Great-Aunts (x2): Unknown cancer    We do not have records on any of these diagnoses.    RISK ASSESSMENT:  At this time, Ms. Gilbert's management should be guided by a family history-based risk assessment. [employer]ie-[employer], version 8 is able to take into account personal factors (age at menarche, age at first birth, etc.) and family history when calculating risk for breast cancer. Computer modeling estimates that Ms. Razos lifetime personal risk for developing breast cancer is up to 29.3% (Kristie-Frandyzick, v8), in comparison to the average 43 y.o.-year-old female's risk of 10.5%. Per NCCN guidelines, a lifetime risk above 20% is considered to be “high risk” where increased screening is warranted; Ms. Gaines risk does fall into that category. This risk assessment is based on the family history information provided at the time of the appointment and could change in the future should new information be obtained.     GENETIC COUNSELING:  We reviewed the family history information in detail. Cases of cancer follow three general patterns: sporadic, familial, and hereditary. While most cancer is sporadic, some cases appear to occur in family clusters. These cases are said to be familial and account for 10-20% of cancer cases. Familial cases may be due to a combination of shared genes and environmental factors among family members. In even fewer families, the cancer is said to be inherited, and the genes responsible for the cancer are known.      Family histories typical of hereditary cancer syndromes usually include multiple first- and second-degree relatives diagnosed with cancer  types that define a syndrome. These cases tend to be diagnosed at younger-than-expected ages and can be bilateral or multifocal. The cancer in these families follows an autosomal dominant inheritance pattern, which indicates the likely presence of a mutation in a cancer susceptibility gene. Children and siblings of an individual believed to carry this mutation have a 50% chance of inheriting that mutation, thereby inheriting the increased risk to develop cancer. These mutations can be passed down from the maternal or the paternal lineage.    GENETIC TESTING:  The risks, benefits and limitations of genetic testing and implications for clinical management following testing were reviewed. DNA test results can influence decisions regarding screening and prevention. Genetic testing can have significant psychological implications for both individuals and families. Also discussed was the possibility of employment and insurance discrimination based on genetic test results and the federal and states laws that are in place to prevent this (SIERRA), as well as the limitations of these laws.      TEST RESULTS: Genetic testing was negative for known deleterious mutations by sequencing and rearrangement testing of the 70 genes with testing at [facility]. A variant(s) of uncertain significance was identified in the SDHD gene. A VUS is a difference within a gene that may or may not impact the function of the gene. VUSs are not clinically actionable findings, and therefore this result does not impact management in any way. The majority of VUSs are ultimately reclassified to benign variants. The identification of a VUS is common in multigene panel testing, given the number of genes being evaluated and the presence of genetic variation in the population. If this variant is ever reclassified, a new report will be issued by the laboratory and released directly to the ordering physician. This assessment is based on the information provided at  the time of the consultation.     CANCER SCREENING: Options available to individuals with a high lifetime risk for breast were discussed, including increased surveillance. Given her family history, Ms. Gilbert is at an increased lifetime risk for breast cancer, which warrants increased surveillance.    Increased surveillance, based on NCCN guidelines, would consist of semi-annual clinical breast exams and monthly self-breast exams starting by age 18 and annual mammography starting 10 years younger than the earliest diagnosis in the family, or by age 40, whichever is earliest. According to an American Cancer Society expert panel and NCCN guidelines, annual breast MRI should be offered to women whose lifetime risk of breast cancer is 20-25 percent or more, typically beginning at the same age as mammography. Breast cancer chemoprevention is another option that can be considered in the future. Studies have shown that Tamoxifen and Raloxifene can cut the risk of estrogen receptor positive breast cancer by up to 50% when taken by high-risk women over a 5-year period. These are not recommended before age 35. There are risks and side effects associated with these medications; therefore, the risks versus benefits must be considered prior to deciding to take chemopreventative medications.This assessment is based on the information provided at the time of consultation. These recommendations could change in the future if Ms. Gilbert tests positive for a hereditary cancer syndrome.     PLAN: Genetic counseling remains available to Ms. Gilbert and her family. She is following with the [facility] at [facility] and plans to continue her care there. If she has any questions or concerns in the future, she is welcome to contact our genetics team at [phone].    Marah Santoyo, MS, [facility] [place] [facility]  Licensed Certified Genetic Counselor    Genetic counseling was performed by Nhung Forrest, genetic counseling  intern.    Cc: Candice Castaneda MD    Total time spent caring for the patient today was 70 minutes.  This time includes chart review, time spent during the visit, and time spent after the visit on documentation and follow-up.

## 2025-03-05 ENCOUNTER — CLINICAL SUPPORT (OUTPATIENT)
Dept: GENETICS | Facility: HOSPITAL | Age: 45
End: 2025-03-05
Payer: COMMERCIAL

## 2025-03-05 DIAGNOSIS — Z80.0 FAMILY HISTORY OF THROAT CANCER: ICD-10-CM

## 2025-03-05 DIAGNOSIS — Z91.89 AT HIGH RISK FOR BREAST CANCER: ICD-10-CM

## 2025-03-05 DIAGNOSIS — Z80.7 FAMILY HISTORY OF MULTIPLE MYELOMA: ICD-10-CM

## 2025-03-05 DIAGNOSIS — Z13.79 GENETIC TESTING: Primary | ICD-10-CM

## 2025-03-05 DIAGNOSIS — Z80.0 FAMILY HISTORY OF COLON CANCER: ICD-10-CM

## 2025-03-07 RX ORDER — LOSARTAN POTASSIUM 50 MG/1
50 TABLET ORAL DAILY
Qty: 90 TABLET | Refills: 1 | Status: SHIPPED | OUTPATIENT
Start: 2025-03-07

## 2025-03-11 ENCOUNTER — HOSPITAL ENCOUNTER (OUTPATIENT)
Dept: MAMMOGRAPHY | Facility: HOSPITAL | Age: 45
Discharge: HOME | End: 2025-03-11
Admitting: OBSTETRICS & GYNECOLOGY
Payer: COMMERCIAL

## 2025-03-11 DIAGNOSIS — Z12.31 BREAST CANCER SCREENING BY MAMMOGRAM: ICD-10-CM

## 2025-03-11 PROCEDURE — 77063 BREAST TOMOSYNTHESIS BI: CPT

## 2025-03-11 PROCEDURE — 77067 SCR MAMMO BI INCL CAD: CPT

## 2025-04-09 ENCOUNTER — TELEPHONE (OUTPATIENT)
Dept: OBSTETRICS AND GYNECOLOGY | Age: 45
End: 2025-04-09
Payer: COMMERCIAL

## 2025-04-09 RX ORDER — NITROFURANTOIN 25; 75 MG/1; MG/1
100 CAPSULE ORAL 2 TIMES DAILY
Qty: 14 CAPSULE | Refills: 0 | Status: SHIPPED | OUTPATIENT
Start: 2025-04-09 | End: 2025-04-16

## 2025-05-30 NOTE — PROGRESS NOTES
BRE[facility]ARE C[facility]     Referring Provider:      Chief complaint: high risk     HPI: Patient presenting to the office today for routine follow-up.  She was previously being followed by Dr. Hurley in the high-risk clinic and since his residential her care has been transferred to me.    I personally reviewed her records and summarized her relevant breast history/[facility]  SDHD VUS    2024 bilateral breast MRI at [facility]  FINDINGS: There is scattered fibroglandular tissue. There is moderate  background parenchymal enhancement.  RIGHT BREAST:    No suspicious enhancing mass or area of non-mass enhancement is  identified.  The visualized axilla is within normal limits.  LEFT BREAST:    No suspicious enhancing mass or area of non-mass enhancement is  identified.  The visualized axilla is within normal limits.  EXTRAMAMMARY FINDINGS:  There are no pathologically enlarged internal mammary chain lymph nodes  on either side.    IMPRESSION AND RECOMMENDATION:  No MRI evidence of malignancy in either breast. Recommend annual  screening mammogram in 2025.  BI-RADS Category 1:    3/11/2025 bilateral screening mammogram at [facility]  BREAST  DENSITY: There are scattered areas of fibroglandular density.  There are no suspicious masses, calcifications, or areas of  architectural distortion.  IMPRESSION/RECOMMENDATION(S):  Negative mammogram showing no change from the right-sided mammogram  dated 3/19/2024 and left-sided mammogram dated 2024.  Recommend annual screening mammogram in one year.  BI-RADS Category 1    She has a personal history of  right breast biopsy fibroadenomatoid hyperplasia,  2019 radial scar       She denies any family history of breast or ovarian cancer.          Review of Systems - Oncology    Medications:    Current Outpatient Medications:     cycloSPORINE (Restasis) 0.05 % ophthalmic emulsion, , Disp: , Rfl:     dronabinol (MARINOL) 5 MG capsule, Take 1 capsule by  mouth 2 (Two) Times a Day Before Meals., Disp: , Rfl:     esomeprazole (nexIUM) 40 MG capsule, Take 1 capsule by mouth Every Morning Before Breakfast. (Patient not taking: Reported on 2/10/2025), Disp: 90 capsule, Rfl: 3    esomeprazole (nexIUM) 40 MG capsule, Take 1 capsule by mouth., Disp: , Rfl:     famotidine (PEPCID) 20 MG tablet, Every Night., Disp: , Rfl:     famotidine (PEPCID) 20 MG tablet, Take 1 tablet by mouth. (Patient not taking: Reported on 2/10/2025), Disp: , Rfl:     ferrous gluconate (FERGON) 324 MG tablet, Take 1 tablet by mouth Daily With Breakfast. (Patient not taking: Reported on 2/10/2025), Disp: 90 tablet, Rfl: 0    levocetirizine (XYZAL) 5 MG tablet, TK 1 T PO D IN THE CARRIE, Disp: , Rfl: 2    linaclotide (LINZESS) 290 MCG capsule capsule, Take 1 capsule by mouth Daily., Disp: , Rfl:     losartan (COZAAR) 50 MG tablet, TAKE 1 TABLET BY MOUTH DAILY, Disp: 90 tablet, Rfl: 1    NON FORMULARY, Domperidone (Patient not taking: Reported on 2/10/2025), Disp: , Rfl:     ondansetron ODT (ZOFRAN-ODT) 4 MG disintegrating tablet, Place 1 tablet on the tongue Every 8 (Eight) Hours As Needed for Nausea or Vomiting. (Patient not taking: Reported on 2/10/2025), Disp: 15 tablet, Rfl: 0    ondansetron ODT (ZOFRAN-ODT) 4 MG disintegrating tablet, Take 1 tablet by mouth Every 6 (Six) Hours As Needed., Disp: , Rfl:     rosuvastatin (CRESTOR) 10 MG tablet, Take 1 tablet by mouth Every Night., Disp: 90 tablet, Rfl: 1    vitamin D3 125 MCG (5000 UT) capsule capsule, , Disp: , Rfl:   No current facility-administered medications for this visit.    Facility-Administered Medications Ordered in Other Visits:     diazePAM (VALIUM) tablet 5 mg, 5 mg, Oral, Once PRN, Candice Walsh MD    Allergies:  Allergies   Allergen Reactions    Amlodipine Other (See Comments)     Swelling        Medical history:  Past Medical History:   Diagnosis Date    Arthritis     Gastroparesis 02/2021    Leiomyoma 05/12/2017    Polycystic  ovary syndrome        Surgical History:  Past Surgical History:   Procedure Laterality Date    BREAST LUMPECTOMY Left     radial scar    CARPAL TUNNEL RELEASE Right 2021    CARPAL TUNNEL RELEASE Left 2021    SPINAL FIXATION SURGERY      SPINE SURGERY      TOTAL ABDOMINAL HYSTERECTOMY WITH SALPINGO OOPHORECTOMY         Family History:  Family History   Problem Relation Age of Onset    No Known Problems Father     Hypertension Mother     Coronary artery disease Maternal Grandmother     Diabetes Maternal Grandmother     Multiple myeloma Maternal Aunt     No Known Problems Maternal Uncle     No Known Problems Paternal Aunt     No Known Problems Paternal Uncle     Colon cancer Maternal Cousin 39    Ovarian cancer Neg Hx     Uterine cancer Neg Hx     Melanoma Neg Hx     Prostate cancer Neg Hx        Social History:   Social History     Socioeconomic History    Marital status: Single    Number of children: 0   Tobacco Use    Smoking status: Never     Passive exposure: Never    Smokeless tobacco: Never   Vaping Use    Vaping status: Never Used   Substance and Sexual Activity    Alcohol use: Never    Drug use: No    Sexual activity: Yes     Partners: Male     Birth control/protection: Hysterectomy     Patient drinks 1 servings of caffeine per day.       GYNECOLOGIC HISTORY:   . P: 0. AB: 0.  Last menstrual period: 2024 pt had partial hysterectomy   Age at menarche: 11  Age at first childbirth: N/A  Lactation/How long: N/A  Age at menopause: N/A  Total years of oral contraceptive use: 1 year  Total years of hormone replacement therapy: N/A      Physical Exam  There were no vitals filed for this visit.  ECOG 0 - Asymptomatic  General: NAD, well appearing  Psych: a&o x 3, normal mood and affect  Eyes: EOMI, no scleral icterus  ENMT: neck supple without masses or thyromegaly, mucus membranes moist  Resp: normal effort, CTAB  CV: RRR, no murmurs, no edema   GI: soft, NT, ND  MSK: normal gait, normal ROM in bilateral  shoulders  Lymph nodes: no cervical, supraclavicular or axillary lymphadenopathy  Breast: symmetric, large, pendulous  Right: No visible abnormalities on inspection while seated, with arms raised or hands on hips. No masses, skin changes, or nipple abnormalities.  Left: No visible abnormalities on inspection while seated, with arms raised or hands on hips. No masses, skin changes, or nipple abnormalities.      Assessment:    High risk for breast cancer-according to TC 8 she had a lifetime risk of 24.3%  History of radial scar    Discussion:  We discussed management options for individuals who are at increased risk (>20% lifetime risk):  1) High risk screening - Annual mammogram and annual breast MRI, alternating one test every 6 months, biannual clinical exam and monthly self breast exam. She meets criteria for high risk screening.  2) Chemoprevention with Tamoxifen, Raloxifene or Exemestane. These may reduce risk up to 50%. I reviewed that these particular medications are not without risks and the risk/benefit ratio must be considered carefully. She is not interested in this currently.  3) Risk reducing surgery such as prophylactic mastectomy  which may reduce risk by 90-95%. We discussed that this is a relatively radical strategy and is generally reserved for individuals with a known genetic mutation predisposing them to an increased risk (~50% risk) of breast cancer. She does not meet criteria for risk reducing surgery.   4) I discussed the importance of exercise and weight management as part of a risk reducing strategy, since increased BMI is associated with an increased risk of breast cancer.     Plan:  Breast MRI and exam in [facility]    I have spent 35 mins in face to face time with the patient and in chart review.    CC:  No ref. provider found  Fatuma Fontenot APRN    EMR Dragon/transcription disclaimer:  Dictated using Dragon dictation

## 2025-06-02 ENCOUNTER — OFFICE VISIT (OUTPATIENT)
Dept: SURGERY | Facility: CLINIC | Age: 45
End: 2025-06-02
Payer: COMMERCIAL

## 2025-06-02 VITALS
DIASTOLIC BLOOD PRESSURE: 84 MMHG | SYSTOLIC BLOOD PRESSURE: 122 MMHG | HEART RATE: 86 BPM | HEIGHT: 65 IN | OXYGEN SATURATION: 96 % | BODY MASS INDEX: 44.32 KG/M2 | WEIGHT: 266 LBS

## 2025-06-02 DIAGNOSIS — Z91.89 AT HIGH RISK FOR BREAST CANCER: Primary | ICD-10-CM

## 2025-06-02 PROCEDURE — 99214 OFFICE O/P EST MOD 30 MIN: CPT | Performed by: NURSE PRACTITIONER

## 2025-08-26 RX ORDER — ROSUVASTATIN CALCIUM 10 MG/1
10 TABLET, COATED ORAL NIGHTLY
Qty: 90 TABLET | Refills: 0 | Status: SHIPPED | OUTPATIENT
Start: 2025-08-26